# Patient Record
Sex: MALE | Race: WHITE | NOT HISPANIC OR LATINO | Employment: OTHER | URBAN - METROPOLITAN AREA
[De-identification: names, ages, dates, MRNs, and addresses within clinical notes are randomized per-mention and may not be internally consistent; named-entity substitution may affect disease eponyms.]

---

## 2017-01-01 ENCOUNTER — HOSPITAL ENCOUNTER (INPATIENT)
Facility: HOSPITAL | Age: 77
LOS: 1 days | Discharge: HOME | DRG: 312 | End: 2017-09-16
Attending: EMERGENCY MEDICINE | Admitting: FAMILY MEDICINE
Payer: COMMERCIAL

## 2017-01-01 ENCOUNTER — APPOINTMENT (EMERGENCY)
Dept: RADIOLOGY | Facility: HOSPITAL | Age: 77
DRG: 312 | End: 2017-01-01
Payer: COMMERCIAL

## 2017-01-01 VITALS
BODY MASS INDEX: 23.99 KG/M2 | TEMPERATURE: 98.5 F | DIASTOLIC BLOOD PRESSURE: 60 MMHG | SYSTOLIC BLOOD PRESSURE: 100 MMHG | WEIGHT: 181 LBS | OXYGEN SATURATION: 98 % | HEIGHT: 73 IN | RESPIRATION RATE: 18 BRPM | HEART RATE: 83 BPM

## 2017-01-01 DIAGNOSIS — I95.2 HYPOTENSION DUE TO DRUGS: ICD-10-CM

## 2017-01-01 DIAGNOSIS — I27.82 CHRONIC PULMONARY EMBOLISM (HCC): ICD-10-CM

## 2017-01-01 DIAGNOSIS — R55 NEAR SYNCOPE: Primary | ICD-10-CM

## 2017-01-01 DIAGNOSIS — I95.9 HYPOTENSION: ICD-10-CM

## 2017-01-01 DIAGNOSIS — R09.02 HYPOXIA: ICD-10-CM

## 2017-01-01 LAB
ALBUMIN SERPL BCP-MCNC: 2.9 G/DL (ref 3.5–5)
ALBUMIN SERPL BCP-MCNC: 2.9 G/DL (ref 3.5–5)
ALP SERPL-CCNC: 68 U/L (ref 46–116)
ALP SERPL-CCNC: 75 U/L (ref 46–116)
ALT SERPL W P-5'-P-CCNC: 10 U/L (ref 12–78)
ALT SERPL W P-5'-P-CCNC: 23 U/L (ref 12–78)
ANION GAP SERPL CALCULATED.3IONS-SCNC: 7 MMOL/L (ref 4–13)
ANION GAP SERPL CALCULATED.3IONS-SCNC: 8 MMOL/L (ref 4–13)
APTT PPP: 33 SECONDS (ref 24–33)
AST SERPL W P-5'-P-CCNC: 16 U/L (ref 5–45)
AST SERPL W P-5'-P-CCNC: 17 U/L (ref 5–45)
ATRIAL RATE: 87 BPM
BACTERIA BLD CULT: NORMAL
BACTERIA BLD CULT: NORMAL
BASOPHILS # BLD AUTO: 0.1 THOUSANDS/ΜL (ref 0–0.1)
BASOPHILS NFR BLD AUTO: 1 % (ref 0–1)
BILIRUB SERPL-MCNC: 2.1 MG/DL (ref 0.2–1)
BILIRUB SERPL-MCNC: 2.2 MG/DL (ref 0.2–1)
BILIRUB UR QL STRIP: NEGATIVE
BILIRUB UR QL STRIP: NEGATIVE
BUN SERPL-MCNC: 22 MG/DL (ref 5–25)
BUN SERPL-MCNC: 24 MG/DL (ref 5–25)
CALCIUM SERPL-MCNC: 8.3 MG/DL (ref 8.3–10.1)
CALCIUM SERPL-MCNC: 8.5 MG/DL (ref 8.3–10.1)
CHLORIDE SERPL-SCNC: 105 MMOL/L (ref 100–108)
CHLORIDE SERPL-SCNC: 108 MMOL/L (ref 100–108)
CLARITY UR: CLEAR
CLARITY UR: CLEAR
CO2 SERPL-SCNC: 22 MMOL/L (ref 21–32)
CO2 SERPL-SCNC: 24 MMOL/L (ref 21–32)
COLOR UR: NORMAL
COLOR UR: YELLOW
CREAT SERPL-MCNC: 1.13 MG/DL (ref 0.6–1.3)
CREAT SERPL-MCNC: 1.37 MG/DL (ref 0.6–1.3)
DEPRECATED D DIMER PPP: 410 NG/ML (FEU) (ref 190–520)
EOSINOPHIL # BLD AUTO: 0.7 THOUSAND/ΜL (ref 0–0.61)
EOSINOPHIL NFR BLD AUTO: 7 % (ref 0–6)
ERYTHROCYTE [DISTWIDTH] IN BLOOD BY AUTOMATED COUNT: 16.7 % (ref 11.6–15.1)
ERYTHROCYTE [DISTWIDTH] IN BLOOD BY AUTOMATED COUNT: 17 % (ref 11.6–15.1)
FOLATE SERPL-MCNC: >20 NG/ML (ref 3.1–17.5)
GFR SERPL CREATININE-BSD FRML MDRD: 50 ML/MIN/1.73SQ M
GFR SERPL CREATININE-BSD FRML MDRD: 63 ML/MIN/1.73SQ M
GLUCOSE SERPL-MCNC: 140 MG/DL (ref 65–140)
GLUCOSE SERPL-MCNC: 142 MG/DL (ref 65–140)
GLUCOSE SERPL-MCNC: 75 MG/DL (ref 65–140)
GLUCOSE UR STRIP-MCNC: NEGATIVE MG/DL
GLUCOSE UR STRIP-MCNC: NEGATIVE MG/DL
HCT VFR BLD AUTO: 36.8 % (ref 42–52)
HCT VFR BLD AUTO: 38.4 % (ref 42–52)
HGB BLD-MCNC: 11.9 G/DL (ref 14–18)
HGB BLD-MCNC: 12.7 G/DL (ref 14–18)
HGB UR QL STRIP.AUTO: NEGATIVE
HGB UR QL STRIP.AUTO: NEGATIVE
INR PPP: 2.72 (ref 0.86–1.16)
INR PPP: 3.05 (ref 0.86–1.16)
KETONES UR STRIP-MCNC: NEGATIVE MG/DL
KETONES UR STRIP-MCNC: NEGATIVE MG/DL
LACTATE SERPL-SCNC: 1.8 MMOL/L (ref 0.5–2)
LEUKOCYTE ESTERASE UR QL STRIP: NEGATIVE
LEUKOCYTE ESTERASE UR QL STRIP: NEGATIVE
LYMPHOCYTES # BLD AUTO: 1.1 THOUSANDS/ΜL (ref 0.6–4.47)
LYMPHOCYTES NFR BLD AUTO: 12 % (ref 14–44)
MCH RBC QN AUTO: 32.8 PG (ref 27–31)
MCH RBC QN AUTO: 33.5 PG (ref 27–31)
MCHC RBC AUTO-ENTMCNC: 32.2 G/DL (ref 31.4–37.4)
MCHC RBC AUTO-ENTMCNC: 33.1 G/DL (ref 31.4–37.4)
MCV RBC AUTO: 101 FL (ref 82–98)
MCV RBC AUTO: 102 FL (ref 82–98)
MONOCYTES # BLD AUTO: 0.5 THOUSAND/ΜL (ref 0.17–1.22)
MONOCYTES NFR BLD AUTO: 6 % (ref 4–12)
MRSA NOSE QL CULT: NORMAL
NEUTROPHILS # BLD AUTO: 7 THOUSANDS/ΜL (ref 1.85–7.62)
NEUTS SEG NFR BLD AUTO: 74 % (ref 43–75)
NITRITE UR QL STRIP: NEGATIVE
NITRITE UR QL STRIP: NEGATIVE
NRBC BLD AUTO-RTO: 0 /100 WBCS
PH UR STRIP.AUTO: 5 [PH] (ref 5–9)
PH UR STRIP.AUTO: 5 [PH] (ref 5–9)
PLATELET # BLD AUTO: 171 THOUSANDS/UL (ref 130–400)
PLATELET # BLD AUTO: 203 THOUSANDS/UL (ref 130–400)
PMV BLD AUTO: 8.5 FL (ref 8.9–12.7)
PMV BLD AUTO: 8.8 FL (ref 8.9–12.7)
POTASSIUM SERPL-SCNC: 4.3 MMOL/L (ref 3.5–5.3)
POTASSIUM SERPL-SCNC: 4.7 MMOL/L (ref 3.5–5.3)
PROT SERPL-MCNC: 5.5 G/DL (ref 6.4–8.2)
PROT SERPL-MCNC: 5.5 G/DL (ref 6.4–8.2)
PROT UR STRIP-MCNC: NEGATIVE MG/DL
PROT UR STRIP-MCNC: NEGATIVE MG/DL
PROTHROMBIN TIME: 28.8 SECONDS (ref 9.4–11.7)
PROTHROMBIN TIME: 32.4 SECONDS (ref 9.4–11.7)
QRS AXIS: 202 DEGREES
QRSD INTERVAL: 174 MS
QT INTERVAL: 456 MS
QTC INTERVAL: 542 MS
RBC # BLD AUTO: 3.61 MILLION/UL (ref 4.7–6.1)
RBC # BLD AUTO: 3.79 MILLION/UL (ref 4.7–6.1)
SODIUM SERPL-SCNC: 136 MMOL/L (ref 136–145)
SODIUM SERPL-SCNC: 138 MMOL/L (ref 136–145)
SP GR UR STRIP.AUTO: 1.01 (ref 1–1.03)
SP GR UR STRIP.AUTO: 1.01 (ref 1–1.03)
T WAVE AXIS: 2 DEGREES
TROPONIN I SERPL-MCNC: <0.02 NG/ML
UROBILINOGEN UR QL STRIP.AUTO: 0.2 E.U./DL
UROBILINOGEN UR QL STRIP.AUTO: 0.2 E.U./DL
VENTRICULAR RATE: 85 BPM
VIT B12 SERPL-MCNC: 478 PG/ML (ref 100–900)
WBC # BLD AUTO: 7.8 THOUSAND/UL (ref 4.8–10.8)
WBC # BLD AUTO: 9.4 THOUSAND/UL (ref 4.8–10.8)

## 2017-01-01 PROCEDURE — 85379 FIBRIN DEGRADATION QUANT: CPT | Performed by: EMERGENCY MEDICINE

## 2017-01-01 PROCEDURE — 82948 REAGENT STRIP/BLOOD GLUCOSE: CPT

## 2017-01-01 PROCEDURE — 96360 HYDRATION IV INFUSION INIT: CPT

## 2017-01-01 PROCEDURE — 80053 COMPREHEN METABOLIC PANEL: CPT | Performed by: INTERNAL MEDICINE

## 2017-01-01 PROCEDURE — 85610 PROTHROMBIN TIME: CPT | Performed by: EMERGENCY MEDICINE

## 2017-01-01 PROCEDURE — 83605 ASSAY OF LACTIC ACID: CPT | Performed by: EMERGENCY MEDICINE

## 2017-01-01 PROCEDURE — 96361 HYDRATE IV INFUSION ADD-ON: CPT

## 2017-01-01 PROCEDURE — 71010 HB CHEST X-RAY 1 VIEW FRONTAL (PORTABLE): CPT

## 2017-01-01 PROCEDURE — 36415 COLL VENOUS BLD VENIPUNCTURE: CPT | Performed by: EMERGENCY MEDICINE

## 2017-01-01 PROCEDURE — 85610 PROTHROMBIN TIME: CPT | Performed by: INTERNAL MEDICINE

## 2017-01-01 PROCEDURE — 87040 BLOOD CULTURE FOR BACTERIA: CPT | Performed by: EMERGENCY MEDICINE

## 2017-01-01 PROCEDURE — 87081 CULTURE SCREEN ONLY: CPT | Performed by: INTERNAL MEDICINE

## 2017-01-01 PROCEDURE — 85025 COMPLETE CBC W/AUTO DIFF WBC: CPT | Performed by: EMERGENCY MEDICINE

## 2017-01-01 PROCEDURE — 81003 URINALYSIS AUTO W/O SCOPE: CPT | Performed by: EMERGENCY MEDICINE

## 2017-01-01 PROCEDURE — 71275 CT ANGIOGRAPHY CHEST: CPT

## 2017-01-01 PROCEDURE — 81003 URINALYSIS AUTO W/O SCOPE: CPT | Performed by: INTERNAL MEDICINE

## 2017-01-01 PROCEDURE — 85730 THROMBOPLASTIN TIME PARTIAL: CPT | Performed by: EMERGENCY MEDICINE

## 2017-01-01 PROCEDURE — 84484 ASSAY OF TROPONIN QUANT: CPT | Performed by: EMERGENCY MEDICINE

## 2017-01-01 PROCEDURE — 99285 EMERGENCY DEPT VISIT HI MDM: CPT

## 2017-01-01 PROCEDURE — 82746 ASSAY OF FOLIC ACID SERUM: CPT | Performed by: INTERNAL MEDICINE

## 2017-01-01 PROCEDURE — 70450 CT HEAD/BRAIN W/O DYE: CPT

## 2017-01-01 PROCEDURE — 93005 ELECTROCARDIOGRAM TRACING: CPT | Performed by: EMERGENCY MEDICINE

## 2017-01-01 PROCEDURE — 80053 COMPREHEN METABOLIC PANEL: CPT | Performed by: EMERGENCY MEDICINE

## 2017-01-01 PROCEDURE — 82607 VITAMIN B-12: CPT | Performed by: INTERNAL MEDICINE

## 2017-01-01 PROCEDURE — 85027 COMPLETE CBC AUTOMATED: CPT | Performed by: INTERNAL MEDICINE

## 2017-01-01 RX ORDER — FUROSEMIDE 40 MG/1
40 TABLET ORAL DAILY
COMMUNITY
End: 2018-05-12 | Stop reason: HOSPADM

## 2017-01-01 RX ORDER — ACETAMINOPHEN 325 MG/1
650 TABLET ORAL EVERY 6 HOURS PRN
Status: DISCONTINUED | OUTPATIENT
Start: 2017-01-01 | End: 2017-01-01 | Stop reason: HOSPADM

## 2017-01-01 RX ORDER — OXYCODONE HCL 10 MG/1
15 TABLET, FILM COATED, EXTENDED RELEASE ORAL EVERY 4 HOURS PRN
COMMUNITY
End: 2018-01-01

## 2017-01-01 RX ORDER — DIPHENOXYLATE HYDROCHLORIDE AND ATROPINE SULFATE 2.5; .025 MG/1; MG/1
1 TABLET ORAL DAILY
Status: ON HOLD | COMMUNITY
End: 2018-01-01

## 2017-01-01 RX ORDER — WARFARIN SODIUM 2.5 MG/1
1.25 TABLET ORAL
COMMUNITY
End: 2018-01-01 | Stop reason: HOSPADM

## 2017-01-01 RX ORDER — B-COMPLEX WITH VITAMIN C
1 TABLET ORAL 2 TIMES DAILY WITH MEALS
Status: DISCONTINUED | OUTPATIENT
Start: 2017-01-01 | End: 2017-01-01 | Stop reason: HOSPADM

## 2017-01-01 RX ORDER — CARVEDILOL 6.25 MG/1
6.25 TABLET ORAL 2 TIMES DAILY WITH MEALS
COMMUNITY
End: 2018-05-12 | Stop reason: HOSPADM

## 2017-01-01 RX ORDER — WARFARIN SODIUM 5 MG/1
2.5 TABLET ORAL
COMMUNITY
End: 2018-01-01 | Stop reason: HOSPADM

## 2017-01-01 RX ORDER — LISINOPRIL 2.5 MG/1
2.5 TABLET ORAL DAILY
COMMUNITY
End: 2018-01-01

## 2017-01-01 RX ORDER — SODIUM CHLORIDE 9 MG/ML
125 INJECTION, SOLUTION INTRAVENOUS CONTINUOUS
Status: DISCONTINUED | OUTPATIENT
Start: 2017-01-01 | End: 2017-01-01

## 2017-01-01 RX ORDER — ASPIRIN 81 MG/1
81 TABLET ORAL DAILY
Status: DISCONTINUED | OUTPATIENT
Start: 2017-01-01 | End: 2017-01-01 | Stop reason: HOSPADM

## 2017-01-01 RX ORDER — WARFARIN SODIUM 5 MG/1
5 TABLET ORAL
Status: DISCONTINUED | OUTPATIENT
Start: 2017-01-01 | End: 2017-01-01 | Stop reason: HOSPADM

## 2017-01-01 RX ORDER — NITROGLYCERIN 0.4 MG/1
0.4 TABLET SUBLINGUAL
COMMUNITY
End: 2018-05-12 | Stop reason: HOSPADM

## 2017-01-01 RX ORDER — WARFARIN SODIUM 2.5 MG/1
2.5 TABLET ORAL
Status: DISCONTINUED | OUTPATIENT
Start: 2017-01-01 | End: 2017-01-01 | Stop reason: HOSPADM

## 2017-01-01 RX ORDER — ASPIRIN 81 MG/1
81 TABLET ORAL DAILY
COMMUNITY
End: 2018-01-01 | Stop reason: HOSPADM

## 2017-01-01 RX ORDER — EZETIMIBE AND SIMVASTATIN 10; 20 MG/1; MG/1
1 TABLET ORAL
COMMUNITY
End: 2018-05-12 | Stop reason: HOSPADM

## 2017-01-01 RX ORDER — SPIRONOLACTONE 25 MG/1
25 TABLET ORAL DAILY
COMMUNITY
End: 2018-05-12 | Stop reason: HOSPADM

## 2017-01-01 RX ORDER — FAMOTIDINE 20 MG/1
20 TABLET, FILM COATED ORAL DAILY
Status: DISCONTINUED | OUTPATIENT
Start: 2017-01-01 | End: 2017-01-01 | Stop reason: HOSPADM

## 2017-01-01 RX ORDER — SODIUM CHLORIDE 9 MG/ML
100 INJECTION, SOLUTION INTRAVENOUS CONTINUOUS
Status: DISCONTINUED | OUTPATIENT
Start: 2017-01-01 | End: 2017-01-01 | Stop reason: HOSPADM

## 2017-01-01 RX ADMIN — FAMOTIDINE 20 MG: 20 TABLET ORAL at 14:06

## 2017-01-01 RX ADMIN — CARBIDOPA AND LEVODOPA 1 TABLET: 25; 100 TABLET ORAL at 22:03

## 2017-01-01 RX ADMIN — IOHEXOL 85 ML: 350 INJECTION, SOLUTION INTRAVENOUS at 13:14

## 2017-01-01 RX ADMIN — WARFARIN SODIUM 5 MG: 5 TABLET ORAL at 20:43

## 2017-01-01 RX ADMIN — CARBIDOPA AND LEVODOPA 1 TABLET: 25; 100 TABLET ORAL at 08:48

## 2017-01-01 RX ADMIN — SODIUM CHLORIDE 125 ML/HR: 0.9 INJECTION, SOLUTION INTRAVENOUS at 16:10

## 2017-01-01 RX ADMIN — SODIUM CHLORIDE 1000 ML: 0.9 INJECTION, SOLUTION INTRAVENOUS at 14:11

## 2017-01-01 RX ADMIN — PRAVASTATIN SODIUM: 40 TABLET ORAL at 22:05

## 2017-01-01 RX ADMIN — ASPIRIN 81 MG: 81 TABLET ORAL at 08:47

## 2017-01-01 RX ADMIN — CALCIUM CARBONATE 500 MG (1,250 MG)-VITAMIN D3 200 UNIT TABLET 1 TABLET: at 08:48

## 2017-01-01 RX ADMIN — SODIUM CHLORIDE 1000 ML: 0.9 INJECTION, SOLUTION INTRAVENOUS at 12:22

## 2017-01-01 RX ADMIN — SODIUM CHLORIDE 100 ML/HR: 0.9 INJECTION, SOLUTION INTRAVENOUS at 20:44

## 2017-09-15 PROBLEM — I95.2 HYPOTENSION DUE TO DRUGS: Status: ACTIVE | Noted: 2017-01-01

## 2018-01-01 ENCOUNTER — APPOINTMENT (EMERGENCY)
Dept: CT IMAGING | Facility: HOSPITAL | Age: 78
End: 2018-01-01
Payer: COMMERCIAL

## 2018-01-01 ENCOUNTER — APPOINTMENT (EMERGENCY)
Dept: RADIOLOGY | Facility: HOSPITAL | Age: 78
DRG: 871 | End: 2018-01-01
Payer: COMMERCIAL

## 2018-01-01 ENCOUNTER — APPOINTMENT (INPATIENT)
Dept: RADIOLOGY | Facility: HOSPITAL | Age: 78
DRG: 871 | End: 2018-01-01
Attending: INTERNAL MEDICINE
Payer: COMMERCIAL

## 2018-01-01 ENCOUNTER — OFFICE VISIT (OUTPATIENT)
Dept: NEUROSURGERY | Facility: CLINIC | Age: 78
End: 2018-01-01
Payer: COMMERCIAL

## 2018-01-01 ENCOUNTER — APPOINTMENT (INPATIENT)
Dept: RADIOLOGY | Facility: HOSPITAL | Age: 78
DRG: 083 | End: 2018-01-01
Payer: COMMERCIAL

## 2018-01-01 ENCOUNTER — DOCUMENTATION (OUTPATIENT)
Dept: NEUROSURGERY | Facility: CLINIC | Age: 78
End: 2018-01-01

## 2018-01-01 ENCOUNTER — HOSPITAL ENCOUNTER (INPATIENT)
Facility: HOSPITAL | Age: 78
LOS: 3 days | Discharge: NURSING, REHAB OR LONG-TERM CARE | DRG: 083 | End: 2018-04-27
Attending: SURGERY | Admitting: SURGERY
Payer: COMMERCIAL

## 2018-01-01 ENCOUNTER — HOSPITAL ENCOUNTER (INPATIENT)
Facility: HOSPITAL | Age: 78
LOS: 3 days | DRG: 871 | End: 2018-05-12
Attending: EMERGENCY MEDICINE | Admitting: INTERNAL MEDICINE
Payer: COMMERCIAL

## 2018-01-01 ENCOUNTER — APPOINTMENT (INPATIENT)
Dept: RADIOLOGY | Facility: HOSPITAL | Age: 78
DRG: 083 | End: 2018-01-01
Attending: EMERGENCY MEDICINE
Payer: COMMERCIAL

## 2018-01-01 ENCOUNTER — TELEPHONE (OUTPATIENT)
Dept: NEUROSURGERY | Facility: CLINIC | Age: 78
End: 2018-01-01

## 2018-01-01 ENCOUNTER — HOSPITAL ENCOUNTER (EMERGENCY)
Facility: HOSPITAL | Age: 78
End: 2018-04-24
Attending: EMERGENCY MEDICINE | Admitting: EMERGENCY MEDICINE
Payer: COMMERCIAL

## 2018-01-01 VITALS
OXYGEN SATURATION: 100 % | WEIGHT: 156 LBS | SYSTOLIC BLOOD PRESSURE: 102 MMHG | HEIGHT: 73 IN | RESPIRATION RATE: 36 BRPM | TEMPERATURE: 98.4 F | DIASTOLIC BLOOD PRESSURE: 64 MMHG | BODY MASS INDEX: 20.67 KG/M2 | HEART RATE: 91 BPM

## 2018-01-01 VITALS
TEMPERATURE: 97.5 F | WEIGHT: 156.97 LBS | RESPIRATION RATE: 18 BRPM | HEART RATE: 91 BPM | DIASTOLIC BLOOD PRESSURE: 58 MMHG | OXYGEN SATURATION: 98 % | BODY MASS INDEX: 20.8 KG/M2 | HEIGHT: 73 IN | SYSTOLIC BLOOD PRESSURE: 105 MMHG

## 2018-01-01 VITALS
BODY MASS INDEX: 20.67 KG/M2 | DIASTOLIC BLOOD PRESSURE: 66 MMHG | SYSTOLIC BLOOD PRESSURE: 118 MMHG | HEART RATE: 64 BPM | TEMPERATURE: 97.2 F | WEIGHT: 156 LBS | HEIGHT: 73 IN | RESPIRATION RATE: 16 BRPM

## 2018-01-01 VITALS
SYSTOLIC BLOOD PRESSURE: 80 MMHG | HEIGHT: 73 IN | DIASTOLIC BLOOD PRESSURE: 40 MMHG | HEART RATE: 66 BPM | RESPIRATION RATE: 48 BRPM

## 2018-01-01 VITALS
SYSTOLIC BLOOD PRESSURE: 138 MMHG | WEIGHT: 146.61 LBS | TEMPERATURE: 99 F | OXYGEN SATURATION: 96 % | HEART RATE: 105 BPM | BODY MASS INDEX: 19.43 KG/M2 | DIASTOLIC BLOOD PRESSURE: 64 MMHG | HEIGHT: 73 IN | RESPIRATION RATE: 22 BRPM

## 2018-01-01 DIAGNOSIS — D68.9 COAGULOPATHY (HCC): ICD-10-CM

## 2018-01-01 DIAGNOSIS — Z91.89 AT HIGH RISK FOR ASPIRATION: ICD-10-CM

## 2018-01-01 DIAGNOSIS — I61.9 INTRACEREBRAL HEMORRHAGE (HCC): Primary | ICD-10-CM

## 2018-01-01 DIAGNOSIS — S06.5X9A SUBDURAL HEMATOMA (HCC): Primary | ICD-10-CM

## 2018-01-01 DIAGNOSIS — E46 MALNUTRITION (HCC): ICD-10-CM

## 2018-01-01 DIAGNOSIS — S06.5X9A SDH (SUBDURAL HEMATOMA) (HCC): ICD-10-CM

## 2018-01-01 DIAGNOSIS — Z86.79 HISTORY OF SUBDURAL HEMATOMA: ICD-10-CM

## 2018-01-01 DIAGNOSIS — A41.9 SEVERE SEPSIS (HCC): Primary | ICD-10-CM

## 2018-01-01 DIAGNOSIS — G92.8 TOXIC METABOLIC ENCEPHALOPATHY: ICD-10-CM

## 2018-01-01 DIAGNOSIS — I62.00 SUBDURAL HEMORRHAGE (HCC): Primary | ICD-10-CM

## 2018-01-01 DIAGNOSIS — I95.9 HYPOTENSION: ICD-10-CM

## 2018-01-01 DIAGNOSIS — E87.2 LACTIC ACIDOSIS: ICD-10-CM

## 2018-01-01 DIAGNOSIS — R41.0 DISORIENTATION: ICD-10-CM

## 2018-01-01 DIAGNOSIS — R06.82 TACHYPNEA: Primary | ICD-10-CM

## 2018-01-01 DIAGNOSIS — N17.9 AKI (ACUTE KIDNEY INJURY) (HCC): ICD-10-CM

## 2018-01-01 DIAGNOSIS — I95.9 HYPOTENSION, UNSPECIFIED HYPOTENSION TYPE: ICD-10-CM

## 2018-01-01 DIAGNOSIS — E86.0 DEHYDRATION: ICD-10-CM

## 2018-01-01 DIAGNOSIS — R65.20 SEVERE SEPSIS (HCC): Primary | ICD-10-CM

## 2018-01-01 LAB
ABO GROUP BLD: NORMAL
ABO GROUP BLD: NORMAL
ALBUMIN SERPL BCP-MCNC: 2.6 G/DL (ref 3.5–5)
ALBUMIN SERPL BCP-MCNC: 2.7 G/DL (ref 3.5–5)
ALBUMIN SERPL BCP-MCNC: 3.1 G/DL (ref 3.5–5)
ALBUMIN SERPL BCP-MCNC: 3.2 G/DL (ref 3.5–5)
ALBUMIN SERPL BCP-MCNC: 3.3 G/DL (ref 3.5–5)
ALP SERPL-CCNC: 101 U/L (ref 46–116)
ALP SERPL-CCNC: 109 U/L (ref 46–116)
ALP SERPL-CCNC: 115 U/L (ref 46–116)
ALP SERPL-CCNC: 83 U/L (ref 46–116)
ALP SERPL-CCNC: 91 U/L (ref 46–116)
ALT SERPL W P-5'-P-CCNC: 8 U/L (ref 12–78)
ALT SERPL W P-5'-P-CCNC: 9 U/L (ref 12–78)
ALT SERPL W P-5'-P-CCNC: <6 U/L (ref 12–78)
ANION GAP BLD CALC-SCNC: 19 MMOL/L (ref 4–13)
ANION GAP SERPL CALCULATED.3IONS-SCNC: 10 MMOL/L (ref 4–13)
ANION GAP SERPL CALCULATED.3IONS-SCNC: 11 MMOL/L (ref 4–13)
ANION GAP SERPL CALCULATED.3IONS-SCNC: 13 MMOL/L (ref 4–13)
ANION GAP SERPL CALCULATED.3IONS-SCNC: 6 MMOL/L (ref 4–13)
ANION GAP SERPL CALCULATED.3IONS-SCNC: 9 MMOL/L (ref 4–13)
ANION GAP SERPL CALCULATED.3IONS-SCNC: 9 MMOL/L (ref 4–13)
ANISOCYTOSIS BLD QL SMEAR: PRESENT
APTT PPP: 37 SECONDS (ref 23–35)
APTT PPP: 60 SECONDS (ref 23–35)
AST SERPL W P-5'-P-CCNC: 12 U/L (ref 5–45)
AST SERPL W P-5'-P-CCNC: 14 U/L (ref 5–45)
AST SERPL W P-5'-P-CCNC: 21 U/L (ref 5–45)
AST SERPL W P-5'-P-CCNC: 24 U/L (ref 5–45)
AST SERPL W P-5'-P-CCNC: 32 U/L (ref 5–45)
ATRIAL RATE: 105 BPM
ATRIAL RATE: 96 BPM
ATRIAL RATE: 97 BPM
BACTERIA UR QL AUTO: ABNORMAL /HPF
BASE EX.OXY STD BLDV CALC-SCNC: 86.5 % (ref 60–80)
BASE EXCESS BLDV CALC-SCNC: 1.2 MMOL/L
BASOPHILS # BLD AUTO: 0.07 THOUSANDS/ΜL (ref 0–0.1)
BASOPHILS # BLD AUTO: 0.08 THOUSANDS/ΜL (ref 0–0.1)
BASOPHILS # BLD AUTO: 0.08 THOUSANDS/ΜL (ref 0–0.1)
BASOPHILS # BLD MANUAL: 0 THOUSAND/UL (ref 0–0.1)
BASOPHILS NFR BLD AUTO: 1 % (ref 0–1)
BASOPHILS NFR MAR MANUAL: 0 % (ref 0–1)
BILIRUB DIRECT SERPL-MCNC: 0.89 MG/DL (ref 0–0.2)
BILIRUB SERPL-MCNC: 2.5 MG/DL (ref 0.2–1)
BILIRUB SERPL-MCNC: 2.88 MG/DL (ref 0.2–1)
BILIRUB SERPL-MCNC: 3.52 MG/DL (ref 0.2–1)
BILIRUB SERPL-MCNC: 3.78 MG/DL (ref 0.2–1)
BILIRUB SERPL-MCNC: 4.01 MG/DL (ref 0.2–1)
BILIRUB UR QL STRIP: NEGATIVE
BLD GP AB SCN SERPL QL: NEGATIVE
BLD GP AB SCN SERPL QL: NEGATIVE
BUN BLD-MCNC: 14 MG/DL (ref 5–25)
BUN SERPL-MCNC: 15 MG/DL (ref 5–25)
BUN SERPL-MCNC: 17 MG/DL (ref 5–25)
BUN SERPL-MCNC: 24 MG/DL (ref 5–25)
BUN SERPL-MCNC: 31 MG/DL (ref 5–25)
BUN SERPL-MCNC: 69 MG/DL (ref 5–25)
BUN SERPL-MCNC: 75 MG/DL (ref 5–25)
CA-I BLD-SCNC: 1.11 MMOL/L (ref 1.12–1.32)
CALCIUM SERPL-MCNC: 8.4 MG/DL (ref 8.3–10.1)
CALCIUM SERPL-MCNC: 8.5 MG/DL (ref 8.3–10.1)
CALCIUM SERPL-MCNC: 8.8 MG/DL (ref 8.3–10.1)
CALCIUM SERPL-MCNC: 8.9 MG/DL (ref 8.3–10.1)
CALCIUM SERPL-MCNC: 9.1 MG/DL (ref 8.3–10.1)
CALCIUM SERPL-MCNC: 9.7 MG/DL (ref 8.3–10.1)
CHLORIDE BLD-SCNC: 95 MMOL/L (ref 100–108)
CHLORIDE SERPL-SCNC: 102 MMOL/L (ref 100–108)
CHLORIDE SERPL-SCNC: 104 MMOL/L (ref 100–108)
CHLORIDE SERPL-SCNC: 104 MMOL/L (ref 100–108)
CHLORIDE SERPL-SCNC: 105 MMOL/L (ref 100–108)
CHLORIDE SERPL-SCNC: 106 MMOL/L (ref 100–108)
CHLORIDE SERPL-SCNC: 112 MMOL/L (ref 100–108)
CLARITY UR: ABNORMAL
CO2 SERPL-SCNC: 25 MMOL/L (ref 21–32)
CO2 SERPL-SCNC: 25 MMOL/L (ref 21–32)
CO2 SERPL-SCNC: 27 MMOL/L (ref 21–32)
CO2 SERPL-SCNC: 27 MMOL/L (ref 21–32)
CO2 SERPL-SCNC: 29 MMOL/L (ref 21–32)
CO2 SERPL-SCNC: 31 MMOL/L (ref 21–32)
COLOR UR: YELLOW
CREAT BLD-MCNC: 1 MG/DL (ref 0.6–1.3)
CREAT SERPL-MCNC: 1.15 MG/DL (ref 0.6–1.3)
CREAT SERPL-MCNC: 1.22 MG/DL (ref 0.6–1.3)
CREAT SERPL-MCNC: 1.31 MG/DL (ref 0.6–1.3)
CREAT SERPL-MCNC: 1.4 MG/DL (ref 0.6–1.3)
CREAT SERPL-MCNC: 1.41 MG/DL (ref 0.6–1.3)
CREAT SERPL-MCNC: 1.78 MG/DL (ref 0.6–1.3)
EOSINOPHIL # BLD AUTO: 0.7 THOUSAND/ΜL (ref 0–0.61)
EOSINOPHIL # BLD AUTO: 0.78 THOUSAND/ΜL (ref 0–0.61)
EOSINOPHIL # BLD AUTO: 1.17 THOUSAND/ΜL (ref 0–0.61)
EOSINOPHIL # BLD MANUAL: 0 THOUSAND/UL (ref 0–0.4)
EOSINOPHIL NFR BLD AUTO: 10 % (ref 0–6)
EOSINOPHIL NFR BLD AUTO: 11 % (ref 0–6)
EOSINOPHIL NFR BLD AUTO: 20 % (ref 0–6)
EOSINOPHIL NFR BLD MANUAL: 0 % (ref 0–6)
ERYTHROCYTE [DISTWIDTH] IN BLOOD BY AUTOMATED COUNT: 22.2 % (ref 11.6–15.1)
ERYTHROCYTE [DISTWIDTH] IN BLOOD BY AUTOMATED COUNT: 22.3 % (ref 11.6–15.1)
ERYTHROCYTE [DISTWIDTH] IN BLOOD BY AUTOMATED COUNT: 22.5 % (ref 11.6–15.1)
ERYTHROCYTE [DISTWIDTH] IN BLOOD BY AUTOMATED COUNT: 22.7 % (ref 11.6–15.1)
ERYTHROCYTE [DISTWIDTH] IN BLOOD BY AUTOMATED COUNT: 22.7 % (ref 11.6–15.1)
ERYTHROCYTE [DISTWIDTH] IN BLOOD BY AUTOMATED COUNT: 22.9 % (ref 11.6–15.1)
FOLATE SERPL-MCNC: 6 NG/ML (ref 3.1–17.5)
GFR SERPL CREATININE-BSD FRML MDRD: 36 ML/MIN/1.73SQ M
GFR SERPL CREATININE-BSD FRML MDRD: 48 ML/MIN/1.73SQ M
GFR SERPL CREATININE-BSD FRML MDRD: 48 ML/MIN/1.73SQ M
GFR SERPL CREATININE-BSD FRML MDRD: 52 ML/MIN/1.73SQ M
GFR SERPL CREATININE-BSD FRML MDRD: 57 ML/MIN/1.73SQ M
GFR SERPL CREATININE-BSD FRML MDRD: 61 ML/MIN/1.73SQ M
GFR SERPL CREATININE-BSD FRML MDRD: 72 ML/MIN/1.73SQ M
GLUCOSE SERPL-MCNC: 110 MG/DL (ref 65–140)
GLUCOSE SERPL-MCNC: 117 MG/DL (ref 65–140)
GLUCOSE SERPL-MCNC: 120 MG/DL (ref 65–140)
GLUCOSE SERPL-MCNC: 122 MG/DL (ref 65–140)
GLUCOSE SERPL-MCNC: 141 MG/DL (ref 65–140)
GLUCOSE SERPL-MCNC: 143 MG/DL (ref 65–140)
GLUCOSE SERPL-MCNC: 148 MG/DL (ref 65–140)
GLUCOSE SERPL-MCNC: 157 MG/DL (ref 65–140)
GLUCOSE SERPL-MCNC: 159 MG/DL (ref 65–140)
GLUCOSE SERPL-MCNC: 163 MG/DL (ref 65–140)
GLUCOSE UR STRIP-MCNC: NEGATIVE MG/DL
HCO3 BLDV-SCNC: 26.6 MMOL/L (ref 24–30)
HCT VFR BLD AUTO: 30.1 % (ref 36.5–49.3)
HCT VFR BLD AUTO: 30.5 % (ref 36.5–49.3)
HCT VFR BLD AUTO: 32.8 % (ref 36.5–49.3)
HCT VFR BLD AUTO: 33.1 % (ref 36.5–49.3)
HCT VFR BLD AUTO: 33.5 % (ref 36.5–49.3)
HCT VFR BLD AUTO: 34 % (ref 36.5–49.3)
HCT VFR BLD CALC: 36 % (ref 36.5–49.3)
HGB BLD-MCNC: 10.2 G/DL (ref 12–17)
HGB BLD-MCNC: 10.5 G/DL (ref 12–17)
HGB BLD-MCNC: 10.5 G/DL (ref 12–17)
HGB BLD-MCNC: 10.6 G/DL (ref 12–17)
HGB BLD-MCNC: 9.4 G/DL (ref 12–17)
HGB BLD-MCNC: 9.6 G/DL (ref 12–17)
HGB BLDA-MCNC: 12.2 G/DL (ref 12–17)
HGB UR QL STRIP.AUTO: NEGATIVE
HYALINE CASTS #/AREA URNS LPF: ABNORMAL /LPF
HYPERCHROMIA BLD QL SMEAR: PRESENT
INR PPP: 1.47 (ref 0.86–1.16)
INR PPP: 1.83 (ref 0.86–1.16)
INR PPP: 1.91 (ref 0.86–1.16)
INR PPP: 1.93 (ref 0.86–1.16)
INR PPP: 1.99 (ref 0.86–1.16)
INR PPP: 8.14 (ref 0.86–1.16)
INR PPP: 9.07 (ref 0.86–1.16)
KETONES UR STRIP-MCNC: NEGATIVE MG/DL
LACTATE SERPL-SCNC: 1.3 MMOL/L (ref 0.5–2)
LACTATE SERPL-SCNC: 5.6 MMOL/L (ref 0.5–2)
LEUKOCYTE ESTERASE UR QL STRIP: ABNORMAL
LYMPHOCYTES # BLD AUTO: 0 % (ref 14–44)
LYMPHOCYTES # BLD AUTO: 0 THOUSAND/UL (ref 0.6–4.47)
LYMPHOCYTES # BLD AUTO: 0.78 THOUSANDS/ΜL (ref 0.6–4.47)
LYMPHOCYTES # BLD AUTO: 0.83 THOUSANDS/ΜL (ref 0.6–4.47)
LYMPHOCYTES # BLD AUTO: 0.86 THOUSANDS/ΜL (ref 0.6–4.47)
LYMPHOCYTES NFR BLD AUTO: 12 % (ref 14–44)
LYMPHOCYTES NFR BLD AUTO: 12 % (ref 14–44)
LYMPHOCYTES NFR BLD AUTO: 13 % (ref 14–44)
MACROCYTES BLD QL AUTO: PRESENT
MAGNESIUM SERPL-MCNC: 2 MG/DL (ref 1.6–2.6)
MAGNESIUM SERPL-MCNC: 2.1 MG/DL (ref 1.6–2.6)
MCH RBC QN AUTO: 26.4 PG (ref 26.8–34.3)
MCH RBC QN AUTO: 26.7 PG (ref 26.8–34.3)
MCH RBC QN AUTO: 26.9 PG (ref 26.8–34.3)
MCH RBC QN AUTO: 26.9 PG (ref 26.8–34.3)
MCH RBC QN AUTO: 27 PG (ref 26.8–34.3)
MCH RBC QN AUTO: 27.1 PG (ref 26.8–34.3)
MCHC RBC AUTO-ENTMCNC: 30.9 G/DL (ref 31.4–37.4)
MCHC RBC AUTO-ENTMCNC: 31.1 G/DL (ref 31.4–37.4)
MCHC RBC AUTO-ENTMCNC: 31.2 G/DL (ref 31.4–37.4)
MCHC RBC AUTO-ENTMCNC: 31.3 G/DL (ref 31.4–37.4)
MCHC RBC AUTO-ENTMCNC: 31.5 G/DL (ref 31.4–37.4)
MCHC RBC AUTO-ENTMCNC: 32 G/DL (ref 31.4–37.4)
MCV RBC AUTO: 84 FL (ref 82–98)
MCV RBC AUTO: 84 FL (ref 82–98)
MCV RBC AUTO: 86 FL (ref 82–98)
MCV RBC AUTO: 87 FL (ref 82–98)
MONOCYTES # BLD AUTO: 0.14 THOUSAND/UL (ref 0–1.22)
MONOCYTES # BLD AUTO: 0.52 THOUSAND/ΜL (ref 0.17–1.22)
MONOCYTES # BLD AUTO: 0.52 THOUSAND/ΜL (ref 0.17–1.22)
MONOCYTES # BLD AUTO: 0.62 THOUSAND/ΜL (ref 0.17–1.22)
MONOCYTES NFR BLD AUTO: 8 % (ref 4–12)
MONOCYTES NFR BLD AUTO: 9 % (ref 4–12)
MONOCYTES NFR BLD AUTO: 9 % (ref 4–12)
MONOCYTES NFR BLD: 1 % (ref 4–12)
NEUTROPHILS # BLD AUTO: 3.35 THOUSANDS/ΜL (ref 1.85–7.62)
NEUTROPHILS # BLD AUTO: 4.64 THOUSANDS/ΜL (ref 1.85–7.62)
NEUTROPHILS # BLD AUTO: 4.68 THOUSANDS/ΜL (ref 1.85–7.62)
NEUTROPHILS # BLD MANUAL: 14.07 THOUSAND/UL (ref 1.85–7.62)
NEUTS SEG NFR BLD AUTO: 57 % (ref 43–75)
NEUTS SEG NFR BLD AUTO: 67 % (ref 43–75)
NEUTS SEG NFR BLD AUTO: 69 % (ref 43–75)
NEUTS SEG NFR BLD AUTO: 99 % (ref 43–75)
NITRITE UR QL STRIP: NEGATIVE
NON-SQ EPI CELLS URNS QL MICRO: ABNORMAL /HPF
NRBC BLD AUTO-RTO: 0 /100 WBCS
O2 CT BLDV-SCNC: 12.6 ML/DL
P AXIS: 88 DEGREES
P AXIS: 96 DEGREES
PCO2 BLD: 30 MMOL/L (ref 21–32)
PCO2 BLDV: 45.4 MM HG (ref 42–50)
PH BLDV: 7.38 [PH] (ref 7.3–7.4)
PH UR STRIP.AUTO: 5 [PH] (ref 4.5–8)
PHOSPHATE SERPL-MCNC: 2.2 MG/DL (ref 2.3–4.1)
PHOSPHATE SERPL-MCNC: 3.1 MG/DL (ref 2.3–4.1)
PLATELET # BLD AUTO: 160 THOUSANDS/UL (ref 149–390)
PLATELET # BLD AUTO: 181 THOUSANDS/UL (ref 149–390)
PLATELET # BLD AUTO: 181 THOUSANDS/UL (ref 149–390)
PLATELET # BLD AUTO: 194 THOUSANDS/UL (ref 149–390)
PLATELET # BLD AUTO: 205 THOUSANDS/UL (ref 149–390)
PLATELET # BLD AUTO: 250 THOUSANDS/UL (ref 149–390)
PLATELET BLD QL SMEAR: ADEQUATE
PMV BLD AUTO: 10.1 FL (ref 8.9–12.7)
PMV BLD AUTO: 10.2 FL (ref 8.9–12.7)
PMV BLD AUTO: 9.7 FL (ref 8.9–12.7)
PO2 BLDV: 63.2 MM HG (ref 35–45)
POIKILOCYTOSIS BLD QL SMEAR: PRESENT
POTASSIUM BLD-SCNC: 2.6 MMOL/L (ref 3.5–5.3)
POTASSIUM SERPL-SCNC: 2.6 MMOL/L (ref 3.5–5.3)
POTASSIUM SERPL-SCNC: 3.3 MMOL/L (ref 3.5–5.3)
POTASSIUM SERPL-SCNC: 3.5 MMOL/L (ref 3.5–5.3)
POTASSIUM SERPL-SCNC: 3.6 MMOL/L (ref 3.5–5.3)
POTASSIUM SERPL-SCNC: 3.7 MMOL/L (ref 3.5–5.3)
POTASSIUM SERPL-SCNC: 4 MMOL/L (ref 3.5–5.3)
PR INTERVAL: 154 MS
PROCALCITONIN SERPL-MCNC: 1.22 NG/ML
PROT SERPL-MCNC: 5.6 G/DL (ref 6.4–8.2)
PROT SERPL-MCNC: 5.8 G/DL (ref 6.4–8.2)
PROT SERPL-MCNC: 6.1 G/DL (ref 6.4–8.2)
PROT SERPL-MCNC: 6.2 G/DL (ref 6.4–8.2)
PROT SERPL-MCNC: 6.7 G/DL (ref 6.4–8.2)
PROT UR STRIP-MCNC: ABNORMAL MG/DL
PROTHROMBIN TIME: 17.9 SECONDS (ref 12.1–14.4)
PROTHROMBIN TIME: 21.3 SECONDS (ref 12.1–14.4)
PROTHROMBIN TIME: 22.1 SECONDS (ref 12.1–14.4)
PROTHROMBIN TIME: 22.2 SECONDS (ref 12.1–14.4)
PROTHROMBIN TIME: 22.8 SECONDS (ref 12.1–14.4)
PROTHROMBIN TIME: 69.7 SECONDS (ref 12.1–14.4)
PROTHROMBIN TIME: 74.8 SECONDS (ref 12.1–14.4)
QRS AXIS: 246 DEGREES
QRS AXIS: 248 DEGREES
QRS AXIS: 265 DEGREES
QRSD INTERVAL: 162 MS
QRSD INTERVAL: 166 MS
QRSD INTERVAL: 170 MS
QT INTERVAL: 464 MS
QT INTERVAL: 466 MS
QT INTERVAL: 466 MS
QTC INTERVAL: 576 MS
QTC INTERVAL: 582 MS
QTC INTERVAL: 586 MS
RBC # BLD AUTO: 3.5 MILLION/UL (ref 3.88–5.62)
RBC # BLD AUTO: 3.54 MILLION/UL (ref 3.88–5.62)
RBC # BLD AUTO: 3.82 MILLION/UL (ref 3.88–5.62)
RBC # BLD AUTO: 3.91 MILLION/UL (ref 3.88–5.62)
RBC # BLD AUTO: 3.93 MILLION/UL (ref 3.88–5.62)
RBC # BLD AUTO: 3.97 MILLION/UL (ref 3.88–5.62)
RBC #/AREA URNS AUTO: ABNORMAL /HPF
RBC MORPH BLD: PRESENT
RH BLD: POSITIVE
RH BLD: POSITIVE
SODIUM BLD-SCNC: 141 MMOL/L (ref 136–145)
SODIUM SERPL-SCNC: 139 MMOL/L (ref 136–145)
SODIUM SERPL-SCNC: 140 MMOL/L (ref 136–145)
SODIUM SERPL-SCNC: 142 MMOL/L (ref 136–145)
SODIUM SERPL-SCNC: 143 MMOL/L (ref 136–145)
SODIUM SERPL-SCNC: 144 MMOL/L (ref 136–145)
SODIUM SERPL-SCNC: 147 MMOL/L (ref 136–145)
SP GR UR STRIP.AUTO: 1.01 (ref 1–1.03)
SPECIMEN EXPIRATION DATE: NORMAL
SPECIMEN EXPIRATION DATE: NORMAL
SPECIMEN SOURCE: ABNORMAL
T WAVE AXIS: 53 DEGREES
T WAVE AXIS: 74 DEGREES
T WAVE AXIS: 79 DEGREES
T4 FREE SERPL-MCNC: 5.42 NG/DL (ref 0.76–1.46)
TARGETS BLD QL SMEAR: PRESENT
TSH SERPL DL<=0.05 MIU/L-ACNC: <0.007 UIU/ML (ref 0.36–3.74)
TSH SERPL DL<=0.05 MIU/L-ACNC: <0.007 UIU/ML (ref 0.36–3.74)
UROBILINOGEN UR QL STRIP.AUTO: 1 E.U./DL
VENTRICULAR RATE: 92 BPM
VENTRICULAR RATE: 94 BPM
VENTRICULAR RATE: 96 BPM
VIT B12 SERPL-MCNC: 582 PG/ML (ref 100–900)
WBC # BLD AUTO: 12.93 THOUSAND/UL (ref 4.31–10.16)
WBC # BLD AUTO: 14.21 THOUSAND/UL (ref 4.31–10.16)
WBC # BLD AUTO: 5.81 THOUSAND/UL (ref 4.31–10.16)
WBC # BLD AUTO: 5.9 THOUSAND/UL (ref 4.31–10.16)
WBC # BLD AUTO: 6.78 THOUSAND/UL (ref 4.31–10.16)
WBC # BLD AUTO: 7.02 THOUSAND/UL (ref 4.31–10.16)
WBC #/AREA URNS AUTO: ABNORMAL /HPF

## 2018-01-01 PROCEDURE — 86900 BLOOD TYPING SEROLOGIC ABO: CPT | Performed by: EMERGENCY MEDICINE

## 2018-01-01 PROCEDURE — C9132 KCENTRA, PER I.U.: HCPCS | Performed by: SURGERY

## 2018-01-01 PROCEDURE — 82948 REAGENT STRIP/BLOOD GLUCOSE: CPT

## 2018-01-01 PROCEDURE — 96375 TX/PRO/DX INJ NEW DRUG ADDON: CPT

## 2018-01-01 PROCEDURE — 85610 PROTHROMBIN TIME: CPT | Performed by: EMERGENCY MEDICINE

## 2018-01-01 PROCEDURE — 99285 EMERGENCY DEPT VISIT HI MDM: CPT

## 2018-01-01 PROCEDURE — 93005 ELECTROCARDIOGRAM TRACING: CPT

## 2018-01-01 PROCEDURE — 73080 X-RAY EXAM OF ELBOW: CPT

## 2018-01-01 PROCEDURE — 99233 SBSQ HOSP IP/OBS HIGH 50: CPT | Performed by: SURGERY

## 2018-01-01 PROCEDURE — 96367 TX/PROPH/DG ADDL SEQ IV INF: CPT

## 2018-01-01 PROCEDURE — 97163 PT EVAL HIGH COMPLEX 45 MIN: CPT

## 2018-01-01 PROCEDURE — 85730 THROMBOPLASTIN TIME PARTIAL: CPT | Performed by: EMERGENCY MEDICINE

## 2018-01-01 PROCEDURE — 82746 ASSAY OF FOLIC ACID SERUM: CPT | Performed by: PHYSICIAN ASSISTANT

## 2018-01-01 PROCEDURE — 80053 COMPREHEN METABOLIC PANEL: CPT | Performed by: EMERGENCY MEDICINE

## 2018-01-01 PROCEDURE — 97167 OT EVAL HIGH COMPLEX 60 MIN: CPT

## 2018-01-01 PROCEDURE — 70450 CT HEAD/BRAIN W/O DYE: CPT

## 2018-01-01 PROCEDURE — 99232 SBSQ HOSP IP/OBS MODERATE 35: CPT | Performed by: INTERNAL MEDICINE

## 2018-01-01 PROCEDURE — 96361 HYDRATE IV INFUSION ADD-ON: CPT

## 2018-01-01 PROCEDURE — 99238 HOSP IP/OBS DSCHRG MGMT 30/<: CPT | Performed by: SURGERY

## 2018-01-01 PROCEDURE — 85027 COMPLETE CBC AUTOMATED: CPT | Performed by: INTERNAL MEDICINE

## 2018-01-01 PROCEDURE — 85027 COMPLETE CBC AUTOMATED: CPT | Performed by: EMERGENCY MEDICINE

## 2018-01-01 PROCEDURE — 85025 COMPLETE CBC W/AUTO DIFF WBC: CPT | Performed by: EMERGENCY MEDICINE

## 2018-01-01 PROCEDURE — 99223 1ST HOSP IP/OBS HIGH 75: CPT | Performed by: SURGERY

## 2018-01-01 PROCEDURE — 83605 ASSAY OF LACTIC ACID: CPT | Performed by: EMERGENCY MEDICINE

## 2018-01-01 PROCEDURE — 93010 ELECTROCARDIOGRAM REPORT: CPT | Performed by: INTERNAL MEDICINE

## 2018-01-01 PROCEDURE — 99213 OFFICE O/P EST LOW 20 MIN: CPT | Performed by: PHYSICIAN ASSISTANT

## 2018-01-01 PROCEDURE — G8979 MOBILITY GOAL STATUS: HCPCS

## 2018-01-01 PROCEDURE — 84100 ASSAY OF PHOSPHORUS: CPT | Performed by: EMERGENCY MEDICINE

## 2018-01-01 PROCEDURE — 85610 PROTHROMBIN TIME: CPT | Performed by: SURGERY

## 2018-01-01 PROCEDURE — 96365 THER/PROPH/DIAG IV INF INIT: CPT

## 2018-01-01 PROCEDURE — 72125 CT NECK SPINE W/O DYE: CPT

## 2018-01-01 PROCEDURE — 83735 ASSAY OF MAGNESIUM: CPT | Performed by: EMERGENCY MEDICINE

## 2018-01-01 PROCEDURE — 99232 SBSQ HOSP IP/OBS MODERATE 35: CPT | Performed by: NURSE PRACTITIONER

## 2018-01-01 PROCEDURE — 85007 BL SMEAR W/DIFF WBC COUNT: CPT | Performed by: EMERGENCY MEDICINE

## 2018-01-01 PROCEDURE — 82607 VITAMIN B-12: CPT | Performed by: PHYSICIAN ASSISTANT

## 2018-01-01 PROCEDURE — 84443 ASSAY THYROID STIM HORMONE: CPT | Performed by: PHYSICIAN ASSISTANT

## 2018-01-01 PROCEDURE — 86850 RBC ANTIBODY SCREEN: CPT | Performed by: EMERGENCY MEDICINE

## 2018-01-01 PROCEDURE — 87040 BLOOD CULTURE FOR BACTERIA: CPT | Performed by: EMERGENCY MEDICINE

## 2018-01-01 PROCEDURE — 99223 1ST HOSP IP/OBS HIGH 75: CPT | Performed by: NEUROLOGICAL SURGERY

## 2018-01-01 PROCEDURE — 96372 THER/PROPH/DIAG INJ SC/IM: CPT

## 2018-01-01 PROCEDURE — 99232 SBSQ HOSP IP/OBS MODERATE 35: CPT | Performed by: SURGERY

## 2018-01-01 PROCEDURE — G8988 SELF CARE GOAL STATUS: HCPCS

## 2018-01-01 PROCEDURE — 72128 CT CHEST SPINE W/O DYE: CPT

## 2018-01-01 PROCEDURE — 86901 BLOOD TYPING SEROLOGIC RH(D): CPT | Performed by: EMERGENCY MEDICINE

## 2018-01-01 PROCEDURE — 84439 ASSAY OF FREE THYROXINE: CPT | Performed by: EMERGENCY MEDICINE

## 2018-01-01 PROCEDURE — 80053 COMPREHEN METABOLIC PANEL: CPT | Performed by: SURGERY

## 2018-01-01 PROCEDURE — 94660 CPAP INITIATION&MGMT: CPT

## 2018-01-01 PROCEDURE — 84145 PROCALCITONIN (PCT): CPT | Performed by: INTERNAL MEDICINE

## 2018-01-01 PROCEDURE — 94760 N-INVAS EAR/PLS OXIMETRY 1: CPT

## 2018-01-01 PROCEDURE — 81001 URINALYSIS AUTO W/SCOPE: CPT | Performed by: EMERGENCY MEDICINE

## 2018-01-01 PROCEDURE — 80047 BASIC METABLC PNL IONIZED CA: CPT

## 2018-01-01 PROCEDURE — 82248 BILIRUBIN DIRECT: CPT | Performed by: EMERGENCY MEDICINE

## 2018-01-01 PROCEDURE — 80048 BASIC METABOLIC PNL TOTAL CA: CPT | Performed by: INTERNAL MEDICINE

## 2018-01-01 PROCEDURE — G8987 SELF CARE CURRENT STATUS: HCPCS

## 2018-01-01 PROCEDURE — 93005 ELECTROCARDIOGRAM TRACING: CPT | Performed by: SURGERY

## 2018-01-01 PROCEDURE — 72131 CT LUMBAR SPINE W/O DYE: CPT

## 2018-01-01 PROCEDURE — 84443 ASSAY THYROID STIM HORMONE: CPT | Performed by: EMERGENCY MEDICINE

## 2018-01-01 PROCEDURE — 36415 COLL VENOUS BLD VENIPUNCTURE: CPT | Performed by: EMERGENCY MEDICINE

## 2018-01-01 PROCEDURE — 99233 SBSQ HOSP IP/OBS HIGH 50: CPT | Performed by: NEUROLOGICAL SURGERY

## 2018-01-01 PROCEDURE — 85014 HEMATOCRIT: CPT

## 2018-01-01 PROCEDURE — 71045 X-RAY EXAM CHEST 1 VIEW: CPT

## 2018-01-01 PROCEDURE — 73070 X-RAY EXAM OF ELBOW: CPT

## 2018-01-01 PROCEDURE — G8978 MOBILITY CURRENT STATUS: HCPCS

## 2018-01-01 PROCEDURE — 99223 1ST HOSP IP/OBS HIGH 75: CPT | Performed by: INTERNAL MEDICINE

## 2018-01-01 PROCEDURE — 85027 COMPLETE CBC AUTOMATED: CPT | Performed by: SURGERY

## 2018-01-01 PROCEDURE — 82805 BLOOD GASES W/O2 SATURATION: CPT | Performed by: EMERGENCY MEDICINE

## 2018-01-01 PROCEDURE — 94762 N-INVAS EAR/PLS OXIMTRY CONT: CPT

## 2018-01-01 RX ORDER — ESCITALOPRAM OXALATE 10 MG/1
15 TABLET ORAL DAILY
COMMUNITY
End: 2018-05-12 | Stop reason: HOSPADM

## 2018-01-01 RX ORDER — ONDANSETRON 2 MG/ML
4 INJECTION INTRAMUSCULAR; INTRAVENOUS EVERY 6 HOURS PRN
Status: DISCONTINUED | OUTPATIENT
Start: 2018-01-01 | End: 2018-01-01 | Stop reason: HOSPADM

## 2018-01-01 RX ORDER — LIDOCAINE HYDROCHLORIDE AND EPINEPHRINE 10; 10 MG/ML; UG/ML
20 INJECTION, SOLUTION INFILTRATION; PERINEURAL ONCE
Status: COMPLETED | OUTPATIENT
Start: 2018-01-01 | End: 2018-01-01

## 2018-01-01 RX ORDER — ONDANSETRON 2 MG/ML
4 INJECTION INTRAMUSCULAR; INTRAVENOUS EVERY 6 HOURS PRN
Status: DISCONTINUED | OUTPATIENT
Start: 2018-01-01 | End: 2018-05-12 | Stop reason: HOSPADM

## 2018-01-01 RX ORDER — POTASSIUM CHLORIDE 20 MEQ/1
60 TABLET, EXTENDED RELEASE ORAL ONCE
Status: COMPLETED | OUTPATIENT
Start: 2018-01-01 | End: 2018-01-01

## 2018-01-01 RX ORDER — MORPHINE SULFATE 2 MG/ML
2 INJECTION, SOLUTION INTRAMUSCULAR; INTRAVENOUS
Status: DISCONTINUED | OUTPATIENT
Start: 2018-01-01 | End: 2018-01-01

## 2018-01-01 RX ORDER — VANCOMYCIN HYDROCHLORIDE 1 G/200ML
1000 INJECTION, SOLUTION INTRAVENOUS EVERY 24 HOURS
Status: DISCONTINUED | OUTPATIENT
Start: 2018-01-01 | End: 2018-01-01

## 2018-01-01 RX ORDER — SODIUM CHLORIDE 9 MG/ML
125 INJECTION, SOLUTION INTRAVENOUS CONTINUOUS
Status: DISCONTINUED | OUTPATIENT
Start: 2018-01-01 | End: 2018-01-01 | Stop reason: HOSPADM

## 2018-01-01 RX ORDER — SODIUM CHLORIDE 9 MG/ML
50 INJECTION, SOLUTION INTRAVENOUS CONTINUOUS
Status: DISCONTINUED | OUTPATIENT
Start: 2018-01-01 | End: 2018-01-01

## 2018-01-01 RX ORDER — SODIUM CHLORIDE, SODIUM GLUCONATE, SODIUM ACETATE, POTASSIUM CHLORIDE, MAGNESIUM CHLORIDE, SODIUM PHOSPHATE, DIBASIC, AND POTASSIUM PHOSPHATE .53; .5; .37; .037; .03; .012; .00082 G/100ML; G/100ML; G/100ML; G/100ML; G/100ML; G/100ML; G/100ML
1000 INJECTION, SOLUTION INTRAVENOUS ONCE
Status: COMPLETED | OUTPATIENT
Start: 2018-01-01 | End: 2018-01-01

## 2018-01-01 RX ORDER — ONDANSETRON 2 MG/ML
4 INJECTION INTRAMUSCULAR; INTRAVENOUS ONCE
Status: COMPLETED | OUTPATIENT
Start: 2018-01-01 | End: 2018-01-01

## 2018-01-01 RX ORDER — SODIUM CHLORIDE 9 MG/ML
1000 INJECTION, SOLUTION INTRAVENOUS CONTINUOUS
Status: DISCONTINUED | OUTPATIENT
Start: 2018-01-01 | End: 2018-01-01

## 2018-01-01 RX ORDER — PHYTONADIONE 5 MG/1
5 TABLET ORAL DAILY
Status: DISCONTINUED | OUTPATIENT
Start: 2018-01-01 | End: 2018-01-01 | Stop reason: HOSPADM

## 2018-01-01 RX ORDER — LEVETIRACETAM 500 MG/1
500 TABLET ORAL EVERY 12 HOURS SCHEDULED
Qty: 14 TABLET | Refills: 0 | Status: SHIPPED | OUTPATIENT
Start: 2018-01-01 | End: 2018-05-12 | Stop reason: HOSPADM

## 2018-01-01 RX ORDER — SPIRONOLACTONE 25 MG/1
25 TABLET ORAL DAILY
Status: DISCONTINUED | OUTPATIENT
Start: 2018-01-01 | End: 2018-01-01 | Stop reason: HOSPADM

## 2018-01-01 RX ORDER — GINSENG 100 MG
1 CAPSULE ORAL ONCE
Status: COMPLETED | OUTPATIENT
Start: 2018-01-01 | End: 2018-01-01

## 2018-01-01 RX ORDER — LEVETIRACETAM 100 MG/ML
500 SOLUTION ORAL EVERY 12 HOURS SCHEDULED
Status: DISCONTINUED | OUTPATIENT
Start: 2018-01-01 | End: 2018-01-01

## 2018-01-01 RX ORDER — ACETAMINOPHEN 325 MG/1
650 TABLET ORAL EVERY 8 HOURS SCHEDULED
Status: DISCONTINUED | OUTPATIENT
Start: 2018-01-01 | End: 2018-01-01 | Stop reason: HOSPADM

## 2018-01-01 RX ORDER — POTASSIUM CHLORIDE 20 MEQ/1
40 TABLET, EXTENDED RELEASE ORAL ONCE
Status: COMPLETED | OUTPATIENT
Start: 2018-01-01 | End: 2018-01-01

## 2018-01-01 RX ORDER — SODIUM CHLORIDE, SODIUM GLUCONATE, SODIUM ACETATE, POTASSIUM CHLORIDE, MAGNESIUM CHLORIDE, SODIUM PHOSPHATE, DIBASIC, AND POTASSIUM PHOSPHATE .53; .5; .37; .037; .03; .012; .00082 G/100ML; G/100ML; G/100ML; G/100ML; G/100ML; G/100ML; G/100ML
250 INJECTION, SOLUTION INTRAVENOUS ONCE
Status: DISCONTINUED | OUTPATIENT
Start: 2018-01-01 | End: 2018-01-01

## 2018-01-01 RX ORDER — CARVEDILOL 6.25 MG/1
6.25 TABLET ORAL 2 TIMES DAILY WITH MEALS
Status: DISCONTINUED | OUTPATIENT
Start: 2018-01-01 | End: 2018-01-01

## 2018-01-01 RX ORDER — AMOXICILLIN 250 MG
1 CAPSULE ORAL
Qty: 30 TABLET | Refills: 0 | Status: SHIPPED | OUTPATIENT
Start: 2018-01-01 | End: 2018-05-12 | Stop reason: HOSPADM

## 2018-01-01 RX ORDER — VANCOMYCIN HYDROCHLORIDE 1 G/200ML
1000 INJECTION, SOLUTION INTRAVENOUS EVERY 12 HOURS
Status: DISCONTINUED | OUTPATIENT
Start: 2018-01-01 | End: 2018-01-01

## 2018-01-01 RX ORDER — ESCITALOPRAM OXALATE 10 MG/1
15 TABLET ORAL DAILY
Status: DISCONTINUED | OUTPATIENT
Start: 2018-01-01 | End: 2018-01-01

## 2018-01-01 RX ORDER — AMOXICILLIN 250 MG
1 CAPSULE ORAL
Status: DISCONTINUED | OUTPATIENT
Start: 2018-01-01 | End: 2018-01-01 | Stop reason: HOSPADM

## 2018-01-01 RX ORDER — LEVETIRACETAM 500 MG/1
500 TABLET ORAL EVERY 12 HOURS SCHEDULED
Status: DISCONTINUED | OUTPATIENT
Start: 2018-01-01 | End: 2018-01-01 | Stop reason: HOSPADM

## 2018-01-01 RX ORDER — POTASSIUM CHLORIDE 14.9 MG/ML
20 INJECTION INTRAVENOUS ONCE
Status: DISCONTINUED | OUTPATIENT
Start: 2018-01-01 | End: 2018-01-01 | Stop reason: HOSPADM

## 2018-01-01 RX ORDER — ONDANSETRON 2 MG/ML
INJECTION INTRAMUSCULAR; INTRAVENOUS
Status: DISPENSED
Start: 2018-01-01 | End: 2018-01-01

## 2018-01-01 RX ORDER — NITROGLYCERIN 0.4 MG/1
0.4 TABLET SUBLINGUAL
Status: DISCONTINUED | OUTPATIENT
Start: 2018-01-01 | End: 2018-01-01

## 2018-01-01 RX ORDER — MORPHINE SULFATE 4 MG/ML
4 INJECTION, SOLUTION INTRAMUSCULAR; INTRAVENOUS
Status: DISCONTINUED | OUTPATIENT
Start: 2018-01-01 | End: 2018-05-12 | Stop reason: HOSPADM

## 2018-01-01 RX ORDER — MAGNESIUM SULFATE HEPTAHYDRATE 40 MG/ML
2 INJECTION, SOLUTION INTRAVENOUS ONCE
Status: COMPLETED | OUTPATIENT
Start: 2018-01-01 | End: 2018-01-01

## 2018-01-01 RX ORDER — PHENOBARBITAL SODIUM 65 MG/ML
30 INJECTION INTRAMUSCULAR EVERY 6 HOURS PRN
Status: DISCONTINUED | OUTPATIENT
Start: 2018-01-01 | End: 2018-05-12 | Stop reason: HOSPADM

## 2018-01-01 RX ORDER — LEVETIRACETAM 500 MG/1
500 TABLET ORAL EVERY 12 HOURS SCHEDULED
Qty: 14 TABLET | Refills: 0 | Status: CANCELLED | OUTPATIENT
Start: 2018-01-01 | End: 2018-01-01

## 2018-01-01 RX ORDER — MORPHINE SULFATE 2 MG/ML
1 INJECTION, SOLUTION INTRAMUSCULAR; INTRAVENOUS EVERY 4 HOURS PRN
Status: DISCONTINUED | OUTPATIENT
Start: 2018-01-01 | End: 2018-01-01

## 2018-01-01 RX ORDER — CARVEDILOL 6.25 MG/1
6.25 TABLET ORAL 2 TIMES DAILY WITH MEALS
Status: DISCONTINUED | OUTPATIENT
Start: 2018-01-01 | End: 2018-01-01 | Stop reason: HOSPADM

## 2018-01-01 RX ORDER — PHYTONADIONE 5 MG/1
5 TABLET ORAL DAILY
Qty: 30 TABLET | Refills: 0 | Status: SHIPPED | OUTPATIENT
Start: 2018-01-01 | End: 2018-01-01

## 2018-01-01 RX ORDER — FUROSEMIDE 40 MG/1
40 TABLET ORAL 2 TIMES DAILY
Status: DISCONTINUED | OUTPATIENT
Start: 2018-01-01 | End: 2018-01-01 | Stop reason: HOSPADM

## 2018-01-01 RX ORDER — ACETAMINOPHEN 325 MG/1
650 TABLET ORAL EVERY 6 HOURS PRN
Status: DISCONTINUED | OUTPATIENT
Start: 2018-01-01 | End: 2018-01-01

## 2018-01-01 RX ORDER — POTASSIUM CHLORIDE 20 MEQ/1
20 TABLET, EXTENDED RELEASE ORAL ONCE
Status: COMPLETED | OUTPATIENT
Start: 2018-01-01 | End: 2018-01-01

## 2018-01-01 RX ORDER — ACETAMINOPHEN 325 MG/1
650 TABLET ORAL EVERY 6 HOURS PRN
Qty: 30 TABLET | Refills: 0 | Status: SHIPPED | OUTPATIENT
Start: 2018-01-01 | End: 2018-05-12 | Stop reason: HOSPADM

## 2018-01-01 RX ORDER — PHYTONADIONE 10 MG/ML
10 INJECTION, EMULSION INTRAMUSCULAR; INTRAVENOUS; SUBCUTANEOUS ONCE
Status: COMPLETED | OUTPATIENT
Start: 2018-01-01 | End: 2018-01-01

## 2018-01-01 RX ADMIN — PHYTONADIONE 5 MG: 5 TABLET ORAL at 09:19

## 2018-01-01 RX ADMIN — SPIRONOLACTONE 25 MG: 25 TABLET, FILM COATED ORAL at 10:00

## 2018-01-01 RX ADMIN — MORPHINE SULFATE 4 MG: 4 INJECTION INTRAVENOUS at 16:17

## 2018-01-01 RX ADMIN — PHYTONADIONE 5 MG: 5 TABLET ORAL at 13:50

## 2018-01-01 RX ADMIN — VANCOMYCIN HYDROCHLORIDE 1750 MG: 1 INJECTION, POWDER, LYOPHILIZED, FOR SOLUTION INTRAVENOUS at 17:51

## 2018-01-01 RX ADMIN — ESCITALOPRAM OXALATE 15 MG: 10 TABLET, FILM COATED ORAL at 09:02

## 2018-01-01 RX ADMIN — POTASSIUM CHLORIDE 20 MEQ: 1500 TABLET, EXTENDED RELEASE ORAL at 11:33

## 2018-01-01 RX ADMIN — CARBIDOPA AND LEVODOPA 1 TABLET: 25; 100 TABLET ORAL at 15:51

## 2018-01-01 RX ADMIN — SODIUM CHLORIDE 50 ML/HR: 0.9 INJECTION, SOLUTION INTRAVENOUS at 17:15

## 2018-01-01 RX ADMIN — PIPERACILLIN SODIUM,TAZOBACTAM SODIUM 2.25 G: 2; .25 INJECTION, POWDER, FOR SOLUTION INTRAVENOUS at 13:06

## 2018-01-01 RX ADMIN — ACETAMINOPHEN 650 MG: 325 TABLET, FILM COATED ORAL at 06:30

## 2018-01-01 RX ADMIN — LEVETIRACETAM 500 MG: 100 SOLUTION ORAL at 21:21

## 2018-01-01 RX ADMIN — ACETAMINOPHEN 650 MG: 325 TABLET, FILM COATED ORAL at 05:09

## 2018-01-01 RX ADMIN — CARBIDOPA AND LEVODOPA 1 TABLET: 25; 100 TABLET ORAL at 10:00

## 2018-01-01 RX ADMIN — POTASSIUM CHLORIDE 60 MEQ: 1500 TABLET, EXTENDED RELEASE ORAL at 13:50

## 2018-01-01 RX ADMIN — Medication 1 SMALL APPLICATION: at 10:27

## 2018-01-01 RX ADMIN — DESMOPRESSIN ACETATE 21.2 MCG: 4 SOLUTION INTRAVENOUS at 13:16

## 2018-01-01 RX ADMIN — CARBIDOPA AND LEVODOPA 1 TABLET: 25; 100 TABLET ORAL at 21:22

## 2018-01-01 RX ADMIN — Medication 1 TABLET: at 22:12

## 2018-01-01 RX ADMIN — CARBIDOPA AND LEVODOPA 1 TABLET: 25; 100 TABLET ORAL at 17:30

## 2018-01-01 RX ADMIN — LEVETIRACETAM 500 MG: 500 TABLET ORAL at 22:12

## 2018-01-01 RX ADMIN — PIPERACILLIN SODIUM,TAZOBACTAM SODIUM 2.25 G: 2; .25 INJECTION, POWDER, FOR SOLUTION INTRAVENOUS at 05:11

## 2018-01-01 RX ADMIN — MAGNESIUM SULFATE HEPTAHYDRATE 2 G: 40 INJECTION, SOLUTION INTRAVENOUS at 17:28

## 2018-01-01 RX ADMIN — CARBIDOPA AND LEVODOPA 1 TABLET: 25; 100 TABLET ORAL at 17:23

## 2018-01-01 RX ADMIN — SODIUM CHLORIDE 50 ML/HR: 0.9 INJECTION, SOLUTION INTRAVENOUS at 23:25

## 2018-01-01 RX ADMIN — LEVETIRACETAM 500 MG: 500 TABLET ORAL at 09:03

## 2018-01-01 RX ADMIN — PHYTONADIONE 10 MG: 10 INJECTION, EMULSION INTRAMUSCULAR; INTRAVENOUS; SUBCUTANEOUS at 10:52

## 2018-01-01 RX ADMIN — Medication 1 TABLET: at 22:07

## 2018-01-01 RX ADMIN — SODIUM CHLORIDE, SODIUM GLUCONATE, SODIUM ACETATE, POTASSIUM CHLORIDE, MAGNESIUM CHLORIDE, SODIUM PHOSPHATE, DIBASIC, AND POTASSIUM PHOSPHATE 1000 ML: .53; .5; .37; .037; .03; .012; .00082 INJECTION, SOLUTION INTRAVENOUS at 17:44

## 2018-01-01 RX ADMIN — ONDANSETRON 4 MG: 2 INJECTION INTRAMUSCULAR; INTRAVENOUS at 16:37

## 2018-01-01 RX ADMIN — PIPERACILLIN SODIUM,TAZOBACTAM SODIUM 2.25 G: 2; .25 INJECTION, POWDER, FOR SOLUTION INTRAVENOUS at 23:28

## 2018-01-01 RX ADMIN — LEVETIRACETAM 500 MG: 100 SOLUTION ORAL at 11:10

## 2018-01-01 RX ADMIN — PROTHROMBIN, COAGULATION FACTOR VII HUMAN, COAGULATION FACTOR IX HUMAN, COAGULATION FACTOR X HUMAN, PROTEIN C, PROTEIN S HUMAN, AND WATER 3322 UNITS: KIT at 13:55

## 2018-01-01 RX ADMIN — LEVETIRACETAM 500 MG: 500 TABLET ORAL at 09:19

## 2018-01-01 RX ADMIN — PHYTONADIONE 5 MG: 5 TABLET ORAL at 09:04

## 2018-01-01 RX ADMIN — ACETAMINOPHEN 650 MG: 325 TABLET, FILM COATED ORAL at 13:52

## 2018-01-01 RX ADMIN — CARBIDOPA AND LEVODOPA 1 TABLET: 25; 100 TABLET ORAL at 09:19

## 2018-01-01 RX ADMIN — ACETAMINOPHEN 650 MG: 325 TABLET, FILM COATED ORAL at 22:12

## 2018-01-01 RX ADMIN — ESCITALOPRAM OXALATE 15 MG: 10 TABLET, FILM COATED ORAL at 10:00

## 2018-01-01 RX ADMIN — LEVETIRACETAM 500 MG: 500 TABLET ORAL at 22:07

## 2018-01-01 RX ADMIN — FUROSEMIDE 40 MG: 40 TABLET ORAL at 10:00

## 2018-01-01 RX ADMIN — POTASSIUM CHLORIDE 40 MEQ: 1500 TABLET, EXTENDED RELEASE ORAL at 17:23

## 2018-01-01 RX ADMIN — ACETAMINOPHEN 650 MG: 325 TABLET, FILM COATED ORAL at 13:50

## 2018-01-01 RX ADMIN — CARBIDOPA AND LEVODOPA 1 TABLET: 25; 100 TABLET ORAL at 22:12

## 2018-01-01 RX ADMIN — ACETAMINOPHEN 650 MG: 325 TABLET, FILM COATED ORAL at 22:07

## 2018-01-01 RX ADMIN — SODIUM CHLORIDE 125 ML/HR: 0.9 INJECTION, SOLUTION INTRAVENOUS at 11:34

## 2018-01-01 RX ADMIN — POTASSIUM CHLORIDE 20 MEQ: 200 INJECTION, SOLUTION INTRAVENOUS at 11:33

## 2018-01-01 RX ADMIN — MORPHINE SULFATE 1 MG: 2 INJECTION, SOLUTION INTRAMUSCULAR; INTRAVENOUS at 01:02

## 2018-01-01 RX ADMIN — CARVEDILOL 6.25 MG: 6.25 TABLET, FILM COATED ORAL at 10:00

## 2018-01-01 RX ADMIN — CARBIDOPA AND LEVODOPA 1 TABLET: 25; 100 TABLET ORAL at 22:07

## 2018-01-01 RX ADMIN — ESCITALOPRAM OXALATE 15 MG: 10 TABLET, FILM COATED ORAL at 09:19

## 2018-01-01 RX ADMIN — MORPHINE SULFATE 2 MG: 2 INJECTION, SOLUTION INTRAMUSCULAR; INTRAVENOUS at 08:04

## 2018-01-01 RX ADMIN — MORPHINE SULFATE 4 MG: 4 INJECTION INTRAVENOUS at 11:41

## 2018-01-01 RX ADMIN — PIPERACILLIN SODIUM AND TAZOBACTAM SODIUM 3.38 G: 36; 4.5 INJECTION, POWDER, FOR SOLUTION INTRAVENOUS at 17:27

## 2018-01-01 RX ADMIN — ACETAMINOPHEN 650 MG: 325 TABLET, FILM COATED ORAL at 03:17

## 2018-01-01 RX ADMIN — SODIUM CHLORIDE 1000 ML/HR: 0.9 INJECTION, SOLUTION INTRAVENOUS at 16:37

## 2018-01-01 RX ADMIN — LIDOCAINE HYDROCHLORIDE AND EPINEPHRINE 20 ML: 10; 10 INJECTION, SOLUTION INFILTRATION; PERINEURAL at 10:09

## 2018-01-01 RX ADMIN — CARBIDOPA AND LEVODOPA 1 TABLET: 25; 100 TABLET ORAL at 09:03

## 2018-04-24 PROBLEM — I25.10 CAD (CORONARY ARTERY DISEASE): Status: ACTIVE | Noted: 2018-01-01

## 2018-04-24 PROBLEM — S06.360A INTRAPARENCHYMAL HEMATOMA OF BRAIN (HCC): Status: ACTIVE | Noted: 2018-01-01

## 2018-04-24 PROBLEM — G20 PARKINSON DISEASE (HCC): Status: ACTIVE | Noted: 2018-01-01

## 2018-04-24 PROBLEM — Z95.810 HISTORY OF IMPLANTABLE CARDIOVERTER-DEFIBRILLATOR (ICD) PLACEMENT: Status: ACTIVE | Noted: 2018-01-01

## 2018-04-24 PROBLEM — I48.91 ATRIAL FIBRILLATION (HCC): Status: ACTIVE | Noted: 2018-01-01

## 2018-04-24 PROBLEM — S06.5X9A SDH (SUBDURAL HEMATOMA) (HCC): Status: ACTIVE | Noted: 2018-01-01

## 2018-04-24 PROBLEM — E11.9 DIABETES MELLITUS TYPE 2, DIET-CONTROLLED (HCC): Status: ACTIVE | Noted: 2018-01-01

## 2018-04-24 PROBLEM — I50.9 CHF (CONGESTIVE HEART FAILURE) (HCC): Status: ACTIVE | Noted: 2018-01-01

## 2018-04-24 NOTE — SOCIAL WORK
CM met with pt to discuss the role of CM  Pt lives alone in a 1st floor home which has 2STE  Pt drives and was fully independent PTA  Pt owns a C-Pap and gets his supplies through [address] has a living will at home  Pt states no hx of substance abuse  Pt states minor depression managed by his PCP  Pt has no hx of IP rehab  CM will follow   Pt would like to use the Meds to [facility]

## 2018-04-24 NOTE — MEDICAL STUDENT
Consultation - Neurosurgery   Komal Arshad 68 y o  male MRN: [id number]  Unit/Bed#: ED 09 Encounter: [id number]      Assessment / Plan:  1  Acute fall with LOC  · PT/OT evaluation  2  Head laceration  · Wound care and management by trauma team  3  Mild left frontal and left parietal subdural hematoma  · CTH from today revealed mild left/right frontal subdural hematoma and left parietal subdural hematoma  Xray of C-Spine revealed no deformities/fractures  · Repeat CT of head w/o contrast for tomorrow (4/25) to assess progress the progress of brain bleeding  · STAT CT if GCS decreases by 2 points within an hour  GCS was 14 at exam today  · Keppra BID for 1 week as seizure prophylaxis  · SBP goal of < 160 mmHg  4  Mild right frontal subdural hematoma  · See above  5  Atrial fibrillation  · INR upon admission was > 9  · Vitamin K/DDAVP/K-centra provided, hold Warfin, reduce therapeutic INR to less than 3  · Cardiology consult due to [address] of ablation x 6 weeks  6  Medical Management  · CHF / CAD  · Cardiac pacemaker   · Type 2 Diabetes  · HTN / Hyperlipidemia  · Pain management consider muscle relaxant for right neck soreness    No neurosurgical management necessary at this time  History of Present Illness   HPI: Komal Arshad is a 68y o  year old male who presents with a fall this morning  The patient reports that he was walking to [facility] to obtain his CPAP machine  He tripped over a large curb causing him to fall backwards  The patient struck the back of his head and had a loss of consciousness for a period of minutes  He awoke in the presence of EMS and was transported to [facility] and subsequently transported to [facility]  The patient has no complaints at this time other than being hungry and some head soreness  Consults    Review of Systems   Constitutional: Negative for fatigue and fever  HENT: Positive for hearing loss (Chronic, L > R)   Negative for ear pain and tinnitus  Eyes: Positive for visual disturbance ("fuzzy" vision bilaterally)  Negative for pain  Respiratory: Positive for shortness of breath  Negative for apnea, cough, chest tightness and wheezing  Cardiovascular: Positive for chest pain  Negative for palpitations and leg swelling  Gastrointestinal: Negative for abdominal pain, constipation, diarrhea, nausea and vomiting  Genitourinary: Positive for difficulty urinating (Hesitancy)  Negative for hematuria and urgency  Musculoskeletal: Positive for gait problem and neck pain  Negative for back pain, joint swelling and neck stiffness  Neurological: Positive for tremors  Negative for seizures, syncope, speech difficulty, weakness, light-headedness, numbness and headaches         Historical Information   Past Medical History:   Diagnosis Date    Arthritis     Cardiac disease     [facility]     Coronary artery disease     Diabetes mellitus ([facility] )     Hyperlipidemia     Hypertension     Stroke ([facility] )     7 months ago     Past Surgical History:   Procedure Laterality Date    CARDIAC DEFIBRILLATOR PLACEMENT      with multiple revisions    CARDIAC ELECTROPHYSIOLOGY STUDY AND ABLATION      4/2018    CHOLECYSTECTOMY      CORONARY ANGIOPLASTY WITH STENT PLACEMENT      age 77    EYE SURGERY Bilateral     cataract    JOINT REPLACEMENT Right     knee x 10    LUMBAR SPINE SURGERY      unknown      History   Alcohol Use    Yes     Comment: social     History   Drug Use No     History   Smoking Status    Former Smoker   Smokeless Tobacco    Never Used     Family History   Problem Relation Age of Onset    Hypertension Father     Stroke Father     Stroke Brother        Meds/Allergies   all current active meds have been reviewed  Allergies   Allergen Reactions    Valium [Diazepam] Other (See Comments)     anger       Objective   No intake or output data in the 24 hours ending 04/24/18 1459    Physical Exam Constitutional: No distress  HENT:   Head: Normocephalic  Head is with laceration  Head is without raccoon's eyes and without Desai's sign  Eyes: EOM are normal  Pupils are equal, round, and reactive to light  Neck: Normal range of motion  Tenderness to palpation along right anterior neck   Pulmonary/Chest: No respiratory distress  He has no wheezes  He exhibits no tenderness  Shortness of breath   Abdominal: Soft  He exhibits no distension  There is no tenderness  There is no rebound and no guarding  Musculoskeletal: Normal range of motion  Cervical back: He exhibits no tenderness, no bony tenderness, no swelling and no edema  Thoracic back: He exhibits no tenderness, no bony tenderness, no swelling and no edema  Lumbar back: He exhibits no tenderness, no bony tenderness, no swelling and no edema  Neurological: He has a normal Finger-Nose-Finger Test    Reflex Scores:       Bicep reflexes are 2+ on the right side and 2+ on the left side  Brachioradialis reflexes are 2+ on the right side and 2+ on the left side  Patellar reflexes are 2+ on the right side and 2+ on the left side  Achilles reflexes are 2+ on the right side and 2+ on the left side  Skin: Skin is warm and dry  Abrasion noted  He is not diaphoretic  Psychiatric: He has a normal mood and affect  His speech is normal and behavior is normal  Judgment and thought content normal      Neurologic Exam     Mental Status   Disoriented to person  Disoriented to place  Disoriented to year and month  Registration: recalls 3 of 3 objects  Follows 1 step commands  Attention: normal  Concentration: normal    Speech: speech is normal   Level of consciousness: alert  Knowledge: good  Able to perform simple calculations  Able to name object  Able to repeat  Normal comprehension       Cranial Nerves     CN II   Visual acuity: normal with correction  Right visual field deficit: none  Left visual field deficit: none     CN III, IV, VI   Pupils are equal, round, and reactive to light  Extraocular motions are normal    Right pupil: Size: 3 mm  Shape: regular  Reactivity: brisk  Consensual response: intact  Left pupil: Size: 3 mm  Shape: regular  Reactivity: brisk  Consensual response: intact  CN III: no CN III palsy  CN VI: no CN VI palsy  Nystagmus: none   Diplopia: none  Ophthalmoparesis: none  Upgaze: normal  Downgaze: normal  Conjugate gaze: present    CN V   Right facial sensation deficit: none  Left facial sensation deficit: none    CN VII   Right facial weakness: none  Left facial weakness: none    CN VIII   Hearing: impaired    CN XI   Right trapezius strength: normal  Left trapezius strength: normal    CN XII   Tongue: not atrophic  Tongue deviation: none    Motor Exam   Muscle bulk: decreased  Overall muscle tone: normal  Right arm pronator drift: absent  Left arm pronator drift: absent    Strength   Strength 5/5 except as noted  Knee extension 4/5 bilaterally     Sensory Exam   Right leg proprioception: normal  Left leg proprioception: normal  Right arm pinprick: normal  Left arm pinprick: normal  Right leg pinprick: normal  Left leg pinprick: normal    Gait, Coordination, and Reflexes     Coordination   Finger to nose coordination: normal    Tremor   Resting tremor: present (Right leg resting tremor)    Reflexes   Right brachioradialis: 2+  Left brachioradialis: 2+  Right biceps: 2+  Left biceps: 2+  Right patellar: 2+  Left patellar: 2+  Right achilles: 2+  Left achilles: 2+  Right ankle clonus: absent  Left ankle clonus: absent      Vitals:Blood pressure 101/71, pulse 101, temperature 98 4 °F (36 9 °C), temperature source Oral, resp  rate 20, weight 70 8 kg (156 lb), SpO2 96 %  ,Body mass index is 20 58 kg/m²  Lab Results: I have personally reviewed pertinent results        Imaging Studies: I have personally reviewed pertinent films in PACS    EKG, Pathology, and Other Studies: I have personally reviewed pertinent reports        VTE Prophylaxis: Sequential compression device Ivelisse Melendez) ordered    Code Status: Prior  Advance Directive and Living Will:      Power of :  Ed   POLST:

## 2018-04-24 NOTE — ED PROVIDER NOTES
History  Chief Complaint   Patient presents with    Fall - Major     Patient tripped and fell backwards and hit his head  Laceration noted on the back L side of his head  Abrasion to L shouklder area and L elbow  Pt denies any neck, back or LOC       Fall - Major       Prior to Admission Medications   Prescriptions Last Dose Informant Patient Reported? Taking?   aspirin (ECOTRIN LOW STRENGTH) 81 mg EC tablet   Yes No   Sig: Take 81 mg by mouth daily   calcium carbonate-vitamin D (OSCAL-D) 500 mg-200 units per tablet   Yes No   Sig: Take 1 tablet by mouth 2 (two) times a day with meals   carbidopa-levodopa (SINEMET)  mg per tablet   Yes No   Sig: Take 1 tablet by mouth 3 (three) times a day   carvedilol (COREG) 6 25 mg tablet   Yes No   Sig: Take 6 25 mg by mouth 2 (two) times a day with meals   ezetimibe-simvastatin (VYTORIN) 10-20 mg per tablet   Yes No   Sig: Take 1 tablet by mouth daily at bedtime   furosemide (LASIX) 40 mg tablet   Yes No   Sig: Take 40 mg by mouth 2 (two) times a day Pt only takes when he gains more than two pounds a day   lisinopril (ZESTRIL) 2 5 mg tablet   Yes No   Sig: Take 2 5 mg by mouth daily   multivitamin (THERAGRAN) TABS   Yes No   Sig: Take 1 tablet by mouth daily   nitroglycerin (NITROSTAT) 0 4 mg SL tablet   Yes No   Sig: Place 0 4 mg under the tongue every 5 (five) minutes as needed for chest pain   oxyCODONE (OxyCONTIN) 10 mg 12 hr tablet   Yes No   Sig: Take 15 mg by mouth every 4 (four) hours as needed   spironolactone (ALDACTONE) 25 mg tablet   Yes No   Sig: Take 25 mg by mouth daily   warfarin (COUMADIN) 2 5 mg tablet   Yes No   Sig: Take 2 5 mg by mouth daily   warfarin (COUMADIN) 5 mg tablet   Yes No   Sig: Take 5 mg by mouth daily 2 5mg every day but friday Friday he takes 5mg      Facility-Administered Medications: None       Past Medical History:   Diagnosis Date    Arthritis     Cardiac disease     [facility]     Coronary artery disease     [facility]     Hyperlipidemia     Hypertension     Stroke [facility])        Past Surgical History:   Procedure Laterality Date    CHOLECYSTECTOMY      EYE SURGERY      JOINT REPLACEMENT         History reviewed  No pertinent family history  I have reviewed and agree with the history as documented      Social History   Substance Use Topics    Smoking status: Former Smoker    Smokeless tobacco: Never Used    Alcohol use No        Review of Systems    Physical Exam  ED Triage Vitals [04/24/18 0944]   Temperature Pulse Respirations Blood Pressure SpO2   98 4 °F (36 9 °C) 91 20 99/71 100 %      Temp src Heart Rate Source Patient Position - Orthostatic VS BP Location FiO2 (%)   -- -- -- -- --      Pain Score       4           Orthostatic Vital Signs  Vitals:    04/24/18 0944   BP: 99/71   Pulse: 91       Physical Exam    ED Medications  Medications - No data to display    Diagnostic Studies  Results Reviewed     Procedure Component Value Units Date/Time    Protime-INR [[id number]]     Lab Status:  No result Specimen:  Blood                  CT head without contrast    (Results Pending)   CT cervical spine without contrast    (Results Pending)              Procedures  ECG 12 Lead Documentation  Date/Time: 4/24/2018 9:46 AM  Performed by: Deion Linda  Authorized by: Deion Linda     ECG reviewed by me, the ED Provider: yes    Patient location:  ED  Previous ECG:     Previous ECG:  Unavailable  Interpretation:     Interpretation: abnormal    Rate:     ECG rate:  92    ECG rate assessment: normal    Rhythm:     Rhythm: paced    Pacing:     Capture:  Complete           Phone Contacts  ED Phone Contact    ED Course  ED Course                                [facility]    Disposition  Final diagnoses:   None     ED Disposition     None      Follow-up Information    None       Patient's Medications   Discharge Prescriptions    No medications on file     No discharge procedures on file      ED Provider  Electronically Signed by           Kevin James MD  04/24/18 6168

## 2018-04-24 NOTE — ED PROVIDER NOTES
H&P Exam - Trauma   Figueroa Acosta 68 y o  male MRN: [id number]  Unit/Bed#: ED 06/ED 06 Encounter: [id number]    Assessment/Plan   Trauma Alert: Trauma Acuity: Trauma Evaluation  Model of Arrival: Trauma Mode of Arrival: ALS via    Trauma Team: Current Providers  Attending Provider: Jimmie Arthur MD  Registered Nurse: Justin Barbosa RN  Consultants: None    Trauma Active Problems: head injury    Trauma Plan: CT, PT/INR    Chief Complaint:   Chief Complaint   Patient presents with    Fall - Major     Patient tripped and fell backwards and hit his head  Laceration noted on the back L side of his head  Abrasion to L shouklder area and L elbow  Pt denies any neck, back or LOC       History of Present Illness   HPI:  Figueroa Acosta is a 68 y o  male who presents with Head injury  Mechanism:Details of Incident: Patient tripped while stepping up on a curb an d fell backwards striking his head and L elbow Injury Date: 04/24/18 Injury Time: 0930        Fall - Major   Associated symptoms: headaches (scalp lac)      Review of Systems   Neurological: Positive for headaches (scalp lac)  All other systems reviewed and are negative  Historical Information     Immunizations: There is no immunization history on file for this patient  Past Medical History:   Diagnosis Date    Arthritis     Cardiac disease     [facility]     Coronary artery disease     Diabetes mellitus ([facility]     Hyperlipidemia     Hypertension     Stroke [facility])      History reviewed  No pertinent family history    Past Surgical History:   Procedure Laterality Date    CHOLECYSTECTOMY      EYE SURGERY      JOINT REPLACEMENT       Social History     Social History    Marital status: Single     Spouse name: N/A    Number of children: N/A    Years of education: N/A     Social History Main Topics    Smoking status: Former Smoker    Smokeless tobacco: Never Used    Alcohol use No    Drug use: No    Sexual activity: No     Other Topics Concern    None     Social History Narrative    None       Family History: non-contributory    Meds/Allergies   Prior to Admission Medications   Prescriptions Last Dose Informant Patient Reported? Taking?   aspirin (ECOTRIN LOW STRENGTH) 81 mg EC tablet   Yes Yes   Sig: Take 81 mg by mouth daily   calcium carbonate-vitamin D (OSCAL-D) 500 mg-200 units per tablet   Yes Yes   Sig: Take 1 tablet by mouth 2 (two) times a day with meals   carbidopa-levodopa (SINEMET)  mg per tablet   Yes Yes   Sig: Take 1 tablet by mouth 3 (three) times a day   carvedilol (COREG) 6 25 mg tablet   Yes Yes   Sig: Take 6 25 mg by mouth 2 (two) times a day with meals   escitalopram (LEXAPRO) 10 mg tablet   Yes Yes   Sig: Take 15 mg by mouth daily   ezetimibe-simvastatin (VYTORIN) 10-20 mg per tablet   Yes Yes   Sig: Take 1 tablet by mouth daily at bedtime   furosemide (LASIX) 40 mg tablet   Yes Yes   Sig: Take 40 mg by mouth 2 (two) times a day Pt only takes when he gains more than two pounds a day   lisinopril (ZESTRIL) 2 5 mg tablet   Yes Yes   Sig: Take 2 5 mg by mouth daily   multivitamin (THERAGRAN) TABS   Yes Yes   Sig: Take 1 tablet by mouth daily   nitroglycerin (NITROSTAT) 0 4 mg SL tablet   Yes Yes   Sig: Place 0 4 mg under the tongue every 5 (five) minutes as needed for chest pain   spironolactone (ALDACTONE) 25 mg tablet   Yes Yes   Sig: Take 25 mg by mouth daily   warfarin (COUMADIN) 2 5 mg tablet   Yes Yes   Sig: Take 1 25 mg by mouth daily     warfarin (COUMADIN) 5 mg tablet   Yes Yes   Sig: Take 2 5 mg by mouth daily 2 5mg every day but friday Friday he takes 5mg        Facility-Administered Medications: None       Allergies   Allergen Reactions    Valium [Diazepam] Other (See Comments)     anger       PHYSICAL EXAM    PE limited by:     Objective   Vitals:   First set: Temperature: 98 4 °F (36 9 °C) (04/24/18 0944)  Pulse: 91 (04/24/18 0944)  Respirations: 20 (04/24/18 0944)  Blood Pressure: 99/71 (04/24/18 0944)  SpO2: 100 % (04/24/18 0944)    Primary Survey:   (A) Airway:   (B) Breathing:   (C) Circulation: Pulses:   normal  (D) Disabliity:  GCS Total:  15  (E) Expose:  Completed    Secondary Survey: (Click on Physical Exam tab above)  Physical Exam   Constitutional: He is oriented to person, place, and time  He appears well-developed and well-nourished  HENT:   Nose: Nose normal    Mouth/Throat: Oropharynx is clear and moist    Eyes: Pupils are equal, round, and reactive to light  Neck: Normal range of motion  Cardiovascular: Normal rate  Pulmonary/Chest: Effort normal and breath sounds normal    Abdominal: Soft  Neurological: He is alert and oriented to person, place, and time  Skin: Skin is warm and dry  Psychiatric: He has a normal mood and affect  His behavior is normal  Judgment and thought content normal        Invasive Devices          No matching active lines, drains, or airways          Lab Results:   Results Reviewed     Procedure Component Value Units Date/Time    Protime-INR [[id number]] Collected:  04/24/18 0958    Lab Status:  No result Specimen:  Blood from Arm, Right                  Imaging Studies:   CT head without contrast    (Results Pending)   CT cervical spine without contrast    (Results Pending)       Other Studies:     Code Status: Prior  Advance Directive and Living Will:      Power of :    POLST:      Procedures  Lac Repair  Date/Time: 4/24/2018 10:35 AM  Performed by: Selina Pillai  Authorized by: Selina Pillai   Consent: Verbal consent obtained    Body area: head/neck  Location details: scalp  Laceration length: 2 cm  Vascular damage: no  Anesthesia: local infiltration    Anesthesia:  Local Anesthetic: lidocaine 1% with epinephrine  Anesthetic total: 5 mL    Sedation:  Patient sedated: no    Wound Dehiscence:  Superficial Wound Dehiscence: simple closure      Procedure Details:  Preparation: Patient was prepped and draped in the usual sterile fashion  Irrigation solution: saline  Skin closure: staples  Number of sutures: 5  Technique: simple  Approximation: close  Approximation difficulty: simple  Dressing: antibiotic ointment  Patient tolerance: Patient tolerated the procedure well with no immediate complications             Phone Contacts  ED Phone Contact     ED Course  ED Course          [facility]  [facility] Time    Disposition  Final diagnoses:   None     ED Disposition     None      Follow-up Information    None       Patient's Medications   Discharge Prescriptions    No medications on file     No discharge procedures on file        ED Provider  Electronically Signed by         Milan Cortes MD  04/24/18 3540

## 2018-04-24 NOTE — EMTALA/ACUTE CARE TRANSFER
[facility] [address]  [id number]  Dept: [address] [place] [address] CONSENT    NAME Mack COSBY 1940                              MRN [id number]    I have been informed of my rights regarding examination, treatment, and transfer   by Dr Sofia Mcallister MD    Benefits:      Risks:        Consent for Transfer:  I acknowledge that my medical condition has been evaluated and explained to me by the emergency department physician or other qualified medical person and/or my attending physician, who has recommended that I be transferred to the service of  Accepting Physician: Toño Rivero at    The above potential benefits of such transfer, the potential risks associated with such transfer, and the probable risks of not being transferred have been explained to me, and I fully understand them  The doctor has explained that, in my case, the benefits of transfer outweigh the risks  I agree to be transferred  I authorize the performance of emergency medical procedures and treatments upon me in both transit and upon arrival at the receiving facility  Additionally, I authorize the release of any and all medical records to the receiving facility and request they be transported with me, if possible  I understand that the safest mode of transportation during a medical emergency is an ambulance and that the Hospital advocates the use of this mode of transport  Risks of traveling to the receiving facility by car, including absence of medical control, life sustaining equipment, such as oxygen, and medical personnel has been explained to me and I fully understand them  (OTILIA CORRECT BOX BELOW)  [  ]  I consent to the stated transfer and to be transported by ambulance/helicopter    [  ]  I consent to the stated transfer, but refuse transportation by ambulance and accept full responsibility for my transportation by car  I understand the risks of non-ambulance transfers and I exonerate the Hospital and its staff from any deterioration in my condition that results from this refusal     X___________________________________________    DATE  18  TIME________  Signature of patient or legally responsible individual signing on patient behalf           RELATIONSHIP TO PATIENT_________________________          Provider Certification    NAME Samara Pham                                         1940                              MRN [id number]    A medical screening exam was performed on the above named patient  Based on the examination:    Condition Necessitating Transfer The encounter diagnosis was Intracerebral hemorrhage ([address] )  Patient Condition: The patient has been stabilized such that within reasonable medical probability, no material deterioration of the patient condition or the condition of the unborn child(latonya) is likely to result from the transfer    Reason for Transfer: Level of Care needed not available at this facility    Transfer Requirements: Facility     · Space available and qualified personnel available for treatment as acknowledged by    · Agreed to accept transfer and to provide appropriate medical treatment as acknowledged by       Iraida  · Appropriate medical records of the examination and treatment of the patient are provided at the time of transfer   [address], [address] _______  · Transfer will be performed by qualified personnel from    and appropriate transfer equipment as required, including the use of necessary and appropriate life support measures      Provider Certification: I have examined the patient and explained the following risks and benefits of being transferred/refusing transfer to the patient/family:         Based on these reasonable risks and benefits to the patient and/or the unborn child(latonya), and based upon the information available at the time of the patients examination, I certify that the medical benefits reasonably to be expected from the provision of appropriate medical treatments at another medical facility outweigh the increasing risks, if any, to the individuals medical condition, and in the case of labor to the unborn child, from effecting the transfer      X____________________________________________ DATE 04/24/18        TIME_______      ORIGINAL - SEND TO MEDICAL RECORDS   COPY - SEND WITH PATIENT DURING TRANSFER

## 2018-04-24 NOTE — RESPIRATORY THERAPY NOTE
RT Protocol Note  Vinny Screen 68 y o  male MRN: [id number]  Unit/Bed#: [facility] 907-01 Encounter: [id number]    Assessment    Principal Problem:    SDH (subdural hematoma) (HCC)  Active Problems:    Intraparenchymal hematoma of brain (HCC)    Atrial fibrillation (HCC)    Parkinson disease ([place] [address] )    Diabetes mellitus type 2, diet-controlled ([place] [address] )    CAD (coronary artery disease)    History of implantable cardioverter-defibrillator (ICD) placement    CHF (congestive heart failure) ([place] [address] )      Home Pulmonary Medications:  none       Past Medical History:   Diagnosis Date    Arthritis     Cardiac disease     CHF (congestive heart failure) ([place] [address] )     Coronary artery disease     Diabetes mellitus ([place] [address] )     Hyperlipidemia     Hypertension     Stroke ([place] [address] )     7 months ago     Social History     Social History    Marital status: Single     Spouse name: N/A    Number of children: N/A    Years of education: N/A     Social History Main Topics    Smoking status: Former Smoker    Smokeless tobacco: Never Used    Alcohol use Yes      Comment: social    Drug use: No    Sexual activity: No     Other Topics Concern    None     Social History Narrative    None       Subjective    Subjective Data: pt wears cpap at home    Objective    Physical Exam:   Assessment Type: Assess only  General Appearance: Alert, Awake  Respiratory Pattern: Normal  Chest Assessment: Chest expansion symmetrical  Bilateral Breath Sounds: Clear  Cough: Non-productive  O2 Device: rma    Vitals:  Blood pressure 106/64, pulse 104, temperature 99 4 °F (37 4 °C), temperature source Oral, resp  rate 22, height 6' 1" (1 854 m), weight 71 2 kg (156 lb 15 5 oz), SpO2 99 %  Imaging and other studies: I have personally reviewed pertinent reports  O2 Device: rma     Plan    Respiratory Plan: Discontinue Protocol        Resp Comments:  evaluated per protocol  pt is lying in bed showing no signs of resp distress   pt has hist of SPIKE and wear cpap of +5cmh20 and ramps up to +20 cmh2o  pt has no other pulm history and doesn't take any pulm meds at home  I instructed IS with pt and is able to perform by himself   no other resp intervention needed at this time

## 2018-04-24 NOTE — H&P
H&P Exam - Trauma   Luis Alberto De Jesus 68 y o  male MRN: [id number]  Unit/Bed#: ED 09 Encounter: [id number]    Assessment/Plan   Trauma Alert: Evaluation  Model of Arrival: Ambulance  Trauma Team: Attending Nicole Faust and Residents Nanette Oakes MD  Consultants: Neurosurgery: Kp  Time Called 1:01pm    Trauma Active Problems:   Subdural hematoma  Coumadin coagulopathy  Fall from standing    Trauma Plan:   Admit to trauma HOT protocol  Emergent coumadin reversal w/ [address]  DDAVP for h/o ASA use  Q1H neuro exams  Repeat CT in AM  NPO  IVF  Hold A/C and DVT ppx    Chief Complaint:     History of Present Illness   HPI:  Luis Alberto De Jesus is a 68 y o  male who presents with fall from standing  Patient states he suffered a mechanical fall at approximately 9:00 a m  this morning  Patient states he slept on a curb and fell backwards striking the back of his head  Patient did lose consciousness  Patient denies headaches, vision changes, nausea vomiting, numbness or weakness  Patient does take Coumadin for history of atrial fibrillation and CVA  Additionally, patient also takes aspirin 81  Patient presented to [facility] Internal Medicine see Department where he was GCS 15  He underwent CT scan of his head and C-spine that demonstrated a left frontal and parietal subdural hematoma  There is no evidence of midline shift at that point  Cervical spine showed no acute injuries  It was found, that the patient had a Coumadin coagulopathy, with INR of 9 07  He received 1 dose of vitamin K at [facility] prior to transfer  Review of Systems   Constitutional: Negative  HENT: Negative  Eyes: Negative  Respiratory: Negative  Cardiovascular: Negative  Gastrointestinal: Positive for abdominal pain  Endocrine: Negative  Genitourinary: Negative  Musculoskeletal: Negative  Skin:        Abrasions   Allergic/Immunologic: Negative  Neurological:        +LOC   Hematological: Negative  Psychiatric/Behavioral: Negative  Historical Information     Past Medical History:   Diagnosis Date    Arthritis     Cardiac disease     [facility]est[facility]) [facility] [address] [facility]     Coronary artery disease     Diabetes mellitus ([facility] )     Hyperlipidemia     Hypertension     Stroke [facility])      Past Surgical History:   Procedure Laterality Date    CHOLECYSTECTOMY      EYE SURGERY      JOINT REPLACEMENT       Social History   History   Alcohol Use No     History   Drug Use No     History   Smoking Status    Former Smoker   Smokeless Tobacco    Never Used       There is no immunization history on file for this patient  Last Tetanus:  Unknown  Family History: Non-contributory      Meds/Allergies   all current active meds have been reviewed, current meds:   Current Facility-Administered Medications   Medication Dose Route Frequency    desmopressin (DDAVP) 21 2 mcg in sodium chloride 0 9 % 50 mL IVPB  0 3 mcg/kg Intravenous Once    prothrombin complex conc human (KCENTRA) 3,322 Units  3,322 Units Intravenous Once    and PTA meds:   Prior to Admission Medications   Prescriptions Last Dose Informant Patient Reported?  Taking?   aspirin (ECOTRIN LOW STRENGTH) 81 mg EC tablet 4/24/2018 at Unknown time  Yes Yes   Sig: Take 81 mg by mouth daily   calcium carbonate-vitamin D (OSCAL-D) 500 mg-200 units per tablet 4/24/2018 at Unknown time  Yes Yes   Sig: Take 1 tablet by mouth 2 (two) times a day with meals   carbidopa-levodopa (SINEMET)  mg per tablet 4/24/2018 at Unknown time  Yes Yes   Sig: Take 1 tablet by mouth 3 (three) times a day   carvedilol (COREG) 6 25 mg tablet 4/24/2018 at Unknown time  Yes Yes   Sig: Take 6 25 mg by mouth 2 (two) times a day with meals   escitalopram (LEXAPRO) 10 mg tablet 4/24/2018 at Unknown time  Yes Yes   Sig: Take 15 mg by mouth daily   ezetimibe-simvastatin (VYTORIN) 10-20 mg per tablet 4/24/2018 at Unknown time  Yes Yes   Sig: Take 1 tablet by mouth daily at bedtime furosemide (LASIX) 40 mg tablet 4/24/2018 at Unknown time  Yes Yes   Sig: Take 40 mg by mouth 2 (two) times a day Pt only takes when he gains more than two pounds a day   lisinopril (ZESTRIL) 2 5 mg tablet 4/24/2018 at Unknown time  Yes Yes   Sig: Take 2 5 mg by mouth daily   multivitamin (THERAGRAN) TABS 4/24/2018 at Unknown time  Yes Yes   Sig: Take 1 tablet by mouth daily   nitroglycerin (NITROSTAT) 0 4 mg SL tablet 4/24/2018 at Unknown time  Yes Yes   Sig: Place 0 4 mg under the tongue every 5 (five) minutes as needed for chest pain   spironolactone (ALDACTONE) 25 mg tablet 4/24/2018 at Unknown time  Yes Yes   Sig: Take 25 mg by mouth daily   warfarin (COUMADIN) 2 5 mg tablet 4/24/2018 at Unknown time  Yes Yes   Sig: Take 1 25 mg by mouth daily     warfarin (COUMADIN) 5 mg tablet 4/24/2018 at Unknown time  Yes Yes   Sig: Take 2 5 mg by mouth daily 2 5mg every day but friday Friday he takes 5mg        Facility-Administered Medications: None       Allergies   Allergen Reactions    Valium [Diazepam] Other (See Comments)     anger         PHYSICAL EXAM    Objective   Vitals:   First set: Temperature: 98 4 °F (36 9 °C) (04/24/18 1233)  Pulse: 96 (04/24/18 1233)  Respirations: 20 (04/24/18 1233)  Blood Pressure: 122/76 (04/24/18 1233)    Primary Survey:   (A) Airway:  Patent  (B) Breathing:  Normal  (C) Circulation: Pulses:  Intact  (D) Disabliity:  GCS Total:  15 pupils 4 mm and reactive bilaterally  (E) Expose:  Completed    Secondary Survey: (Click on Physical Exam tab above)  Physical Exam   Constitutional: He is oriented to person, place, and time  He appears well-developed  No distress  HENT:   Left occipital head laceration, stapled  Abrasion to left occiput   Eyes: Pupils are equal, round, and reactive to light  Neck: Normal range of motion  Cardiovascular: Normal rate and regular rhythm  Pulmonary/Chest: Effort normal and breath sounds normal  He exhibits no tenderness  Abdominal: Soft   He exhibits no distension  There is no tenderness  Musculoskeletal: Normal range of motion  He exhibits no edema or deformity  Neurological: He is alert and oriented to person, place, and time  He exhibits normal muscle tone  Skin: Skin is warm and dry  Abrasions to left shoulder, left knee       Invasive Devices     Peripheral Intravenous Line            Peripheral IV 04/24/18 Right Antecubital less than 1 day                Lab Results:   BMP/CMP:   Lab Results   Component Value Date     04/24/2018    K 2 6 (LL) 04/24/2018     04/24/2018    CO2 31 04/24/2018    ANIONGAP 10 04/24/2018    BUN 15 04/24/2018    CREATININE 1 22 04/24/2018    GLUCOSE 159 (H) 04/24/2018    CALCIUM 8 4 04/24/2018    AST 12 04/24/2018    ALT 9 (L) 04/24/2018    ALKPHOS 109 04/24/2018    PROT 6 7 04/24/2018    BILITOT 2 50 (H) 04/24/2018    EGFR 72 04/24/2018   , CBC:   Lab Results   Component Value Date    WBC 5 90 04/24/2018    HGB 12 2 04/24/2018    HCT 33 5 (L) 04/24/2018    MCV 84 04/24/2018     04/24/2018    MCH 26 4 (L) 04/24/2018    MCHC 31 3 (L) 04/24/2018    RDW 22 7 (H) 04/24/2018    MPV 10 2 04/24/2018    and Coagulation:   Lab Results   Component Value Date    INR 9 07 (HH) 04/24/2018     Imaging/EKG Studies:  Ct Head Without Contrast    Result Date: 4/24/2018  Narrative: CT BRAIN - WITHOUT CONTRAST INDICATION:   fall, on coumadin  COMPARISON:  CT head 9/15/2017 TECHNIQUE:  CT examination of the brain was performed  In addition to axial images, coronal 2D reformatted images were created and submitted for interpretation  Radiation dose length product (DLP) for this visit:  1035 94 mGy-cm   This examination, like all CT scans performed in the [facility], was performed utilizing techniques to minimize radiation dose exposure, including the use of iterative reconstruction and automated exposure control  IMAGE QUALITY:  Diagnostic   FINDINGS: PARENCHYMA:  No intracranial mass, mass effect or midline shift  No CT signs of acute infarction  No acute intracranial hemorrhage  Periventricular, centrum semiovale, and subcortical white matter hypodensity is a nonspecific finding likely representing chronic microangiopathy  Small old infarction with encephalomalacia left frontal operculum  Calcification bilateral cavernous internal carotid and distal vertebral arteries  VENTRICLES AND EXTRA-AXIAL SPACES:  6 x 5 mm mid parafalcine hemorrhage  This is superior to the 3rd ventricle  Minimal left anterolateral frontal subarachnoid hemorrhage  There is a sliver of left anterior frontal subdural hemorrhage maximum thickness 2 mm  Minimal left parietal subdural hemorrhage maximum thickness 3 to 4 mm  Right posterior frontal sliver of hyperdensity likely a cortical vessel 28 and 29 series 2  No acute hydrocephalus  VISUALIZED ORBITS AND PARANASAL SINUSES:  Changes of bilateral lens replacements noted  Severe chronic opacification of the left maxillary sinus with partial dehiscence of the ostiomeatal unit  Moderate left mid and anterior ethmoid sinus mucosal thickening  Trace mucosal thickening right maxillary sinus  CALVARIUM AND EXTRACRANIAL SOFT TISSUES:  No skull fracture  Chronic punctate metallic densities in the preauricular soft tissues  Mild left parietal scalp soft tissue swelling/contusion  Impression: Mild left parietal scalp soft tissue swelling/contusion  6 x 5 mm mid parafalcine hemorrhage  This is superior to the 3rd ventricle  Minimal left anterolateral frontal subarachnoid hemorrhage  Left anterior frontal subdural hemorrhage maximum thickness 2 mm  Minimal left parietal subdural hemorrhage maximum thickness 3 to 4 mm  This is over the posterior convexity underlying the scalp contusion  No acute skull fracture  Chronic microangiopathy   I personally discussed this study with Yary Pineda on 4/24/2018 at 10:14 AM  Workstation performed: [id number]     Ct Cervical Spine Without Contrast    Result Date: 4/24/2018  Narrative: CT CERVICAL SPINE - WITHOUT CONTRAST INDICATION:   fall, on coumadin  COMPARISON: None  TECHNIQUE:  CT examination of the cervical spine was performed without intravenous contrast   Contiguous axial images were obtained  Sagittal and coronal reconstructions were performed  Radiation dose length product (DLP) for this visit:  477 27 mGy-cm   This examination, like all CT scans performed in the [facility], was performed utilizing techniques to minimize radiation dose exposure, including the use of iterative  reconstruction and automated exposure control  IMAGE QUALITY:  Diagnostic  FINDINGS: ALIGNMENT:  There is straightening of normal cervical lordosis  No subluxation or compression deformity  Mild levoconvex lower cervical scoliosis  VERTEBRAL BODIES:  No fracture  DEGENERATIVE CHANGES:  Moderate multilevel cervical degenerative changes are noted  No critical central canal stenosis  Moderate multilevel neuroforaminal narrowing most prominent on the right at C3-4 through C5-6  PREVERTEBRAL AND PARASPINAL SOFT TISSUES: No prevertebral soft tissue swelling  Bilateral carotid artery calcification  Heterogeneous nodular partially visualized thyroid gland  Nodules measure less than 1 cm  Incidental discovery of one or more thyroid nodule(s) measuring less than 1 5 cm and without suspicious features is noted in this patient who is above 28years old; according to guidelines published in the February 2015 white paper on incidental thyroid nodules in the Journal of the [employer] of Radiology Coleen Goo), no further evaluation is recommended  THORACIC INLET:  Trace biapical pleural parenchymal scarring and emphysematous disease  Few scattered biapical 2 mm and less nodules  Left chest wall cardiac pacer leads are present  Impression: No cervical spine fracture or traumatic malalignment  Moderate multilevel cervical degenerative changes  2 mm and less biapical lung nodules in retrospect unchanged since September 15, 2017  Based on current Fleischner Society 2017 Guidelines on incidental pulmonary nodule, no routine follow-up is needed if the patient is considered low risk for lung cancer  If the patient is considered high risk for lung cancer, 12 month follow-up non-contrast chest CT is recommended     Workstation performed: [id number]      Code Status: Prior  Advance Directive and Living Will:      Power of :    POLST:

## 2018-04-24 NOTE — TRAUMA DOCUMENTATION
Trauma resident Dr Tomas [place] made aware of pt's critical lab results of PT of 69 7 seconds and INR of 8 14

## 2018-04-24 NOTE — CONSULTS
Consultation - Neurosurgery   Alex Espinoza 68 y o  male MRN: [id number]  Unit/Bed#: ED 09 Encounter: [id number]  Consult completed on 4/24/18 at 1320      Inpatient consult to Neurosurgery  Consult performed by: Tima Driscoll ordered by: Linda Rasheed          Assessment/Plan     Assessment:  1  Bilateral frontal subdural hematomas  2  Left parietal subdural hematoma   3  Mild parafalcine hemorrhage  4  S/p mechanical fall with unknown LOC  5  History of afib on Coumadin  6  Hypertension  7  Hyperlipidemia  8  History of defibrillator    Plan:  · Exam: GCS 14 (-1 confusion) AAOx2, disoriented to time  GRAHAM wo focal weakness  No PD, kearney or clonus  Finger to nose slight dysmetria  · Imaging: personally reviewed on 4/24/18:  · 4/24 - CT head wo: bilateral frontal subdural hematoma, left parietal subdural hematoma, mild parafalcine hemorrhage  · 4/24 - CT c-spine wo: no cervical fractures or malalignment   · Repeat CT head tomorrow or STAT if decline in GCS >2 pts/ 1hr  · INR on admit >9 - given Vitamin K at [facility] and DDAVP given in addition on 4/24/18   · Continue to trend INR  · SBP goal <160  · Keppra 500mg q12h for seizure ppx x 1 week post-trauma  · DVT ppx: SCDs, hold AC/AP secondary to subdural/subarachnoid hemorrhage  · Medical management per primary team  · PT/OT - from nsx standpoint okay to mobilize as tolerated  Pending evaluation  · Recommend cardiology consult secondary to history of cardiac ablation within 6 weeks  · No neurosurgical intervention at this time, will continue to close monitor   Will be admitted under HOT protocol       History of Present Illness   HPI: Aelx Espinoza is a 68y o  year old male with PMH including hypertension, hyperlipidemia, diabetes mellitus, CHF, atrial fibrillation s/p cardiac ablation 6 weeks ago on coumadin, history of a stroke approximately 7 months ago on aspirin 81mg who presents with complaint of mechanical fall backwards around 0900 this morning  He states he was lifting his leg to step up on a curb and fell backwards  Initially the patient denied LOC but later states he believes he did loss consciousness  He was evaluated at [facility], a CT scan was performed showing a small bifrontal and left parietal subdural hematoma  His INR on admission was >9, at [facility] he was given vitamin K  He was transferred to [facility] for further evaluation  The patient lives alone, he denies use of a walker or cane  He admits to having an other prior fall approximately 3 5 months ago but prior only 1-2 in the past total  He admits to taking coumadin  Patient denies taking more than prescribed  His friend and Lin Jalloh present who admits that patient has been more fatigued lately, sleeping 15+ hours  He states he they'll speak on the phone patient has intermittently confusion which has been present over the past few years  Review of Systems   Constitutional: Positive for fatigue  Negative for activity change  HENT: Negative for tinnitus and trouble swallowing  Eyes: Positive for visual disturbance  Negative for photophobia  Respiratory: Positive for shortness of breath  Cardiovascular: Positive for chest pain  Gastrointestinal: Negative for abdominal pain, constipation, diarrhea, nausea and vomiting  Genitourinary: Negative for dysuria, frequency and urgency  Musculoskeletal: Negative for back pain and neck pain  Skin: Positive for wound  Negative for rash  Allergic/Immunologic: Negative for environmental allergies and food allergies  Neurological: Negative for dizziness, weakness, numbness and headaches  Psychiatric/Behavioral: Positive for dysphoric mood  Negative for self-injury and suicidal ideas         Historical Information   Past Medical History:   Diagnosis Date    Arthritis     Cardiac disease     [facility]congestive heart failure) [facility] )     Coronary artery disease     Diabetes mellitus ([facility] )     Hyperlipidemia     Hypertension     Stroke [facility])      Past Surgical History:   Procedure Laterality Date    CHOLECYSTECTOMY      EYE SURGERY      JOINT REPLACEMENT       History   Alcohol Use No     History   Drug Use No     History   Smoking Status    Former Smoker   Smokeless Tobacco    Never Used     History reviewed  No pertinent family history  Meds/Allergies   all current active meds have been reviewed and current meds:   Current Facility-Administered Medications   Medication Dose Route Frequency    desmopressin (DDAVP) 21 2 mcg in sodium chloride 0 9 % 50 mL IVPB  0 3 mcg/kg Intravenous Once    prothrombin complex conc human (KCENTRA) 3,322 Units  3,322 Units Intravenous Once     Allergies   Allergen Reactions    Valium [Diazepam] Other (See Comments)     anger       Objective   I/O     None          Physical Exam   Constitutional: He appears well-developed and well-nourished  HENT:   Head: Normocephalic  Left occipital laceration closed with staples    Eyes: EOM are normal  Pupils are equal, round, and reactive to light  Neck: Normal range of motion  No spinous process tenderness present  Cardiovascular: Tachycardia present  Pulmonary/Chest: Effort normal  No respiratory distress  Abdominal: Soft  There is no tenderness  There is no rebound  Musculoskeletal:        Cervical back: He exhibits no tenderness  Thoracic back: He exhibits no tenderness  Lumbar back: He exhibits no tenderness  Neurological: He is alert  Skin: Skin is warm  Left elbow abrasion with gauze dressing present  Psychiatric: His behavior is normal  Thought content normal      Neurologic Exam     Cranial Nerves     CN III, IV, VI   Pupils are equal, round, and reactive to light  Extraocular motions are normal        Vitals:Blood pressure 128/71, pulse 100, temperature 98 4 °F (36 9 °C), temperature source Oral, resp  rate 20, weight 70 8 kg (156 lb), SpO2 98 %  ,Body mass index is 20 58 kg/m²  Lab Results:     Results from last 7 days  Lab Units 04/24/18  1038 04/24/18  1032   WBC Thousand/uL  --  5 90   HEMOGLOBIN g/dL  --  10 5*   I STAT HEMOGLOBIN g/dl 12 2  --    HEMATOCRIT %  --  33 5*   PLATELETS Thousands/uL  --  250   NEUTROS PCT %  --  57   MONOS PCT %  --  9       Results from last 7 days  Lab Units 04/24/18  1038 04/24/18  1032   SODIUM mmol/L  --  143   POTASSIUM mmol/L  --  2 6*   CHLORIDE mmol/L  --  102   CO2 mmol/L  --  31   BUN mg/dL  --  15   CREATININE mg/dL  --  1 22   CALCIUM mg/dL  --  8 4   TOTAL PROTEIN g/dL  --  6 7   BILIRUBIN TOTAL mg/dL  --  2 50*   ALK PHOS U/L  --  109   ALT U/L  --  9*   AST U/L  --  12   GLUCOSE RANDOM mg/dL  --  163*   GLUCOSE, ISTAT mg/dl 159*  --                Results from last 7 days  Lab Units 04/24/18  0958   INR  9 07*     No results found for: TROPONINT  ABG:No results found for: PHART, ILZ4MTX, PO2ART, AFX5HVH, I4HNOPGD, BEART, SOURCE    Imaging Studies: I have personally reviewed pertinent reports  and I have personally reviewed pertinent films in PACS    Ct Head Without Contrast    Result Date: 4/24/2018  Narrative: CT BRAIN - WITHOUT CONTRAST INDICATION:   fall, on coumadin  COMPARISON:  CT head 9/15/2017 TECHNIQUE:  CT examination of the brain was performed  In addition to axial images, coronal 2D reformatted images were created and submitted for interpretation  Radiation dose length product (DLP) for this visit:  1035 94 mGy-cm   This examination, like all CT scans performed in the [facility], was performed utilizing techniques to minimize radiation dose exposure, including the use of iterative reconstruction and automated exposure control  IMAGE QUALITY:  Diagnostic  FINDINGS: PARENCHYMA:  No intracranial mass, mass effect or midline shift  No CT signs of acute infarction  No acute intracranial hemorrhage    Periventricular, centrum semiovale, and subcortical white matter hypodensity is a nonspecific finding likely representing chronic microangiopathy  Small old infarction with encephalomalacia left frontal operculum  Calcification bilateral cavernous internal carotid and distal vertebral arteries  VENTRICLES AND EXTRA-AXIAL SPACES:  6 x 5 mm mid parafalcine hemorrhage  This is superior to the 3rd ventricle  Minimal left anterolateral frontal subarachnoid hemorrhage  There is a sliver of left anterior frontal subdural hemorrhage maximum thickness 2 mm  Minimal left parietal subdural hemorrhage maximum thickness 3 to 4 mm  Right posterior frontal sliver of hyperdensity likely a cortical vessel 28 and 29 series 2  No acute hydrocephalus  VISUALIZED ORBITS AND PARANASAL SINUSES:  Changes of bilateral lens replacements noted  Severe chronic opacification of the left maxillary sinus with partial dehiscence of the ostiomeatal unit  Moderate left mid and anterior ethmoid sinus mucosal thickening  Trace mucosal thickening right maxillary sinus  CALVARIUM AND EXTRACRANIAL SOFT TISSUES:  No skull fracture  Chronic punctate metallic densities in the preauricular soft tissues  Mild left parietal scalp soft tissue swelling/contusion  Impression: Mild left parietal scalp soft tissue swelling/contusion  6 x 5 mm mid parafalcine hemorrhage  This is superior to the 3rd ventricle  Minimal left anterolateral frontal subarachnoid hemorrhage  Left anterior frontal subdural hemorrhage maximum thickness 2 mm  Minimal left parietal subdural hemorrhage maximum thickness 3 to 4 mm  This is over the posterior convexity underlying the scalp contusion  No acute skull fracture  Chronic microangiopathy  I personally discussed this study with Pita Cornejo on 4/24/2018 at 10:14 AM  Workstation performed: [id number]     Ct Cervical Spine Without Contrast    Result Date: 4/24/2018  Narrative: CT CERVICAL SPINE - WITHOUT CONTRAST INDICATION:   fall, on coumadin  COMPARISON: None   TECHNIQUE:  CT examination of the cervical spine was performed without intravenous contrast   Contiguous axial images were obtained  Sagittal and coronal reconstructions were performed  Radiation dose length product (DLP) for this visit:  477 27 mGy-cm   This examination, like all CT scans performed in the [facility], was performed utilizing techniques to minimize radiation dose exposure, including the use of iterative  reconstruction and automated exposure control  IMAGE QUALITY:  Diagnostic  FINDINGS: ALIGNMENT:  There is straightening of normal cervical lordosis  No subluxation or compression deformity  Mild levoconvex lower cervical scoliosis  VERTEBRAL BODIES:  No fracture  DEGENERATIVE CHANGES:  Moderate multilevel cervical degenerative changes are noted  No critical central canal stenosis  Moderate multilevel neuroforaminal narrowing most prominent on the right at C3-4 through C5-6  PREVERTEBRAL AND PARASPINAL SOFT TISSUES: No prevertebral soft tissue swelling  Bilateral carotid artery calcification  Heterogeneous nodular partially visualized thyroid gland  Nodules measure less than 1 cm  Incidental discovery of one or more thyroid nodule(s) measuring less than 1 5 cm and without suspicious features is noted in this patient who is above 28years old; according to guidelines published in the February 2015 white paper on incidental thyroid nodules in the Journal of the [employer] of Radiology [facility]), no further evaluation is recommended  THORACIC INLET:  Trace biapical pleural parenchymal scarring and emphysematous disease  Few scattered biapical 2 mm and less nodules  Left chest wall cardiac pacer leads are present  Impression: No cervical spine fracture or traumatic malalignment  Moderate multilevel cervical degenerative changes  2 mm and less biapical lung nodules in retrospect unchanged since September 15, 2017   Based on current Fleischner Society 2017 Guidelines on incidental pulmonary nodule, no routine follow-up is needed if the patient is considered low risk for lung cancer  If the patient is considered high risk for lung cancer, 12 month follow-up non-contrast chest CT is recommended  Workstation performed: [id number]     EKG, Pathology, and Other Studies: I have personally reviewed pertinent reports     and I have personally reviewed pertinent films in PACS    VTE Prophylaxis: Sequential compression device (Venodyne)  and Reason for no pharmacologic prophylaxis SDH/SAH    Code Status: Prior  Advance Directive and Living Will:      Power of :    POLST:

## 2018-04-25 PROBLEM — G31.84 MCI (MILD COGNITIVE IMPAIRMENT): Status: ACTIVE | Noted: 2018-01-01

## 2018-04-25 PROBLEM — R53.81 PHYSICAL DECONDITIONING: Status: ACTIVE | Noted: 2018-01-01

## 2018-04-25 PROBLEM — E46 MALNUTRITION (HCC): Status: ACTIVE | Noted: 2018-01-01
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
## 2018-04-25 PROBLEM — R26.2 AMBULATORY DYSFUNCTION: Status: ACTIVE | Noted: 2018-01-01

## 2018-04-25 NOTE — DISCHARGE INSTRUCTIONS
[facility] Emergency Department    [address]    [facility] [address]    Phone:  [phone]    Fax:  [phone]                                       Mike Alfred   MRN: [id number]    Department:  [facility] Emergency Department   Date of Visit:  4/10/2017           After Visit Summary Signature Page     I have received my discharge instructions, and my questions have been answered. I have discussed any challenges I see with this plan with the nurse or doctor.    ..........................................................................................................................................  Patient/Patient Representative Signature      ..........................................................................................................................................  Patient Representative Print Name and Relationship to Patient    ..................................................               ................................................  Date                                            Time    ..........................................................................................................................................  Reviewed by Signature/Title    ...................................................              ..............................................  Date                                                            Time           Do not take any blood thinning medications (ie  No Advil  No motrin  No ibuprofen  No Aleve  No Aspirin  No fishoil  No heparin  No antiplatelet / no anticoagulation medication)  Including Coumadin and Aspirin     Refrain from activity that increases chance of trauma to head or falls  Recommend you take fall precaution  No strenuous activity or sports  Please follow up with the Neurosurgical group in 1 week  2-3 days prior to your appointment complete CT head at any [facility]  To schedule your Ct scan call [phone]    Return to hospital Emergency Room if you experience worsening / new headache, nausea/vomiting, speech/vision change, seizure, confusion / mental status change, weakness, or other neurological changes

## 2018-04-25 NOTE — PHYSICAL THERAPY NOTE
PHYSICAL THERAPY EVALUATION        Patient Name: Nany Doctor  TVHRS'E Date: 4/25/2018         Patient Active Problem List   Diagnosis    Hypotension due to drugs    SDH (subdural hematoma) (HCC)    Intraparenchymal hematoma of brain (HCC)    Atrial fibrillation (HCC)    [facility]    Diabetes mellitus type 2, diet-controlled ([facility] )    CAD (coronary artery disease)    History of implantable cardioverter-defibrillator (ICD) placement    CHF (congestive heart failure) ([facility] )    Fall    MCI (mild cognitive impairment)    Ambulatory dysfunction    Malnutrition ([facility] )    Physical deconditioning       Past Medical History:   Diagnosis Date    Arthritis     Cardiac disease     [facility]     Coronary artery disease     [facility]     Hyperlipidemia     Hypertension     Stroke ([facility] )     7 months ago       Past Surgical History:   Procedure Laterality Date    CARDIAC DEFIBRILLATOR PLACEMENT      with multiple revisions    CARDIAC ELECTROPHYSIOLOGY STUDY AND ABLATION      4/2018    CHOLECYSTECTOMY      CORONARY ANGIOPLASTY WITH STENT PLACEMENT      age 77    EYE SURGERY Bilateral     cataract    JOINT REPLACEMENT Right     knee x 10    LUMBAR SPINE SURGERY      unknown       04/25/18 1042   Note Type   Note type Eval only   Pain Assessment   Pain Assessment 0-10   Pain Score 5   Pain Type Acute pain   Pain Location Buttocks; Head   Home Living   Type of [address] One level;Stairs to enter with rails  (2 KAI)   Home Equipment Cane   Additional Comments Pt states he typically does not use SPC   Prior Function   Level of [place] Independent with ADLs and functional mobility   Lives With Alone   ADL Assistance Independent   IADLs Independent   Falls in the last 6 months 1 to 4   Restrictions/Precautions   Weight Bearing Precautions Per Order No   Other Precautions Cognitive; Bed Alarm; Chair Alarm; Fall Risk;Pain   General Family/Caregiver Present No   Cognition   Overall Cognitive Status Impaired   Arousal/Participation Cooperative   Attention Attends with cues to redirect   Orientation Level Oriented X4   Memory Decreased recall of precautions;Decreased recall of recent events   Following Commands Follows one step commands with increased time or repetition   RUE Assessment   RUE Assessment WFL   LUE Assessment   LUE Assessment WFL   RLE Assessment   RLE Assessment X  (AROM WFL)   Strength RLE   RLE Overall Strength 4/5   LLE Assessment   LLE Assessment X  (AROM WFL)   Strength LLE   LLE Overall Strength 4/5   Light Touch   RLE Light Touch Grossly intact   LLE Light Touch Grossly intact   Bed Mobility   Supine to Sit 4  Minimal assistance   Additional items Assist x 1; Increased time required;Verbal cues;HOB elevated   Transfers   Sit to Stand 4  Minimal assistance   Additional items Assist x 1; Increased time required;Verbal cues   Stand to Sit 4  Minimal assistance   Additional items Assist x 1; Increased time required;Verbal cues   Ambulation/Elevation   Gait pattern Decreased foot clearance; Short stride; Excessively slow; Shuffling   Gait Assistance 4  Minimal assist   Additional items Assist x 1;Verbal cues; Tactile cues   Assistive Device Other (Comment)  (HHA x1)   Distance 80 ft   Stair Management Assistance Not tested   Balance   Static Sitting Fair +   Dynamic Sitting Fair -   Static Standing Fair -   Dynamic Standing Poor +   Ambulatory Poor +   Endurance Deficit   Endurance Deficit Yes   Activity Tolerance   Activity Tolerance Patient limited by fatigue;Patient limited by pain  (cog deficits)   Nurse Made Aware RN confirmed pt appropriate for PT eval   Assessment   Prognosis Good   Problem List Decreased strength;Decreased endurance; Impaired balance;Decreased mobility; Decreased coordination;Decreased cognition; Impaired judgement;Decreased safety awareness;Pain   Assessment Pt is 68 y o  male seen for PT evaluation s/p admit to [facility] on 4/24/2018 w/ bilateral frontal SDH (subdural hematoma) (HCC), L parietal SDH, mild parafalcine hemorrhage s/p fall  PT consulted to assess pt's functional mobility and d/c needs  Comorbidities affecting pt's physical performance at time of assessment include: arthritis, CHF, CAD, Dm, HLD, HTN, h/o CVA  PTA, pt was independent w/ all functional mobility w/ no AD  Personal factors affecting pt at time of IE include: stairs to enter home, inability to navigate community distances, inability to navigate level surfaces w/o external assistance, unable to perform dynamic tasks in community, decreased cognition, limited home support, positive fall history and inability to live alone  Please find objective findings from PT assessment regarding body systems outlined above with impairments and limitations including weakness, decreased ROM, impaired balance, decreased endurance, impaired coordination, gait deviations, pain, decreased activity tolerance, decreased functional mobility tolerance, decreased safety awareness, impaired judgement, fall risk and decreased cognition  The following objective measures performed on IE also reveal limitations: Barthel Index: 55/100  Pt's clinical presentation is currently unstable/unpredictable seen in pt's presentation of fall risk, decline from baseline, neuro checks, pain, abnormal labs  Pt currently demonstrates decreased safety awareness with all functional mobility  Pt ambulated with very narrow FRANCISCO and requires Josiah to assist with balance  Pt to benefit from continued PT tx to address deficits as defined above and maximize level of functional independent mobility and consistency  From PT/mobility standpoint, recommendation at time of d/c would be IP rehab pending progress in order to facilitate return to PLOF     Barriers to Discharge Inaccessible home environment;Decreased caregiver support   Barriers to Discharge Comments pt lives alone   Goals   Patient Goals none expressed   STG Expiration Date 05/05/18   Short Term Goal #1 In 10 days: Pt will perform sup<>sit mod I  Pt will perform sit<>stand and bed<>chair transfers mod I  Pt will amb 150+ ft with LRAD mod I  Pt will ascend/descend 2 stairs mod I  Treatment Day 0   Plan   Treatment/Interventions Functional transfer training;LE strengthening/ROM; Elevations; Therapeutic exercise; Endurance training;Cognitive reorientation;Patient/family training;Equipment eval/education; Bed mobility;Gait training   PT Frequency Other (Comment)  (6x/wk)   Recommendation   Recommendation Post acute IP rehab   Equipment Recommended Walker   PT - OK to Discharge Yes  (to rehab when medically cleared)   Barthel Index   Feeding 10   Bathing 0   Grooming Score 5   Dressing Score 5   Bladder Score 10   Bowels Score 10   Toilet Use Score 5   Transfers (Bed/Chair) Score 10   Mobility (Level Surface) Score 0   Stairs Score 0   Barthel Index Score 55         Yolanda Cruise, PT

## 2018-04-25 NOTE — PROGRESS NOTES
Progress Note - Neurosurgery   Relda Vero 68 y o  male MRN: [id number]  Unit/Bed#: [facility] 907-01 Encounter: [id number]    Assessment:  1  Bilateral frontal subdural hematoma  2  Left parietal subdural hematoma  3  Mild parafalcine hemorrhage  4  S/p mechanical fall  5  History of Afib on Coumadin  6  Hypertension  7  Hyperlipidemia   8  History of defibrillator    Plan:  · Exam: GCS 15  AAOx3, disoriented to month but oriented to year  GRAHAM wo focal weakness  No PD, kearney or clonus  Slight dysmetria bilaterally  · Imaging: personally reviewed and reviewed by attending:  · 4/24 - CT head wo: bilateral subdural hematoma, left parietal subdural hematoma and mild parafalcine hemorrhage  · 4/25 - CT head wo: blossoming of bilateral frontal contusions with surrounding edema  · 4/25 - Images discussed and reviewed with radiologist  · STAT CT head without contrast if decline in GCS >2 pts/1 hr  · INR on admit >9 - given Vitamin K, DDAVP and KCentra  · INR on repeat 1 47 --> 1 99 - continue to closely monitor  · SBP goal <160  · Keppra 500mg BID for seizure ppx x 1 week post-trauma (day 2/7)  · PT/OT: pending evaluation   · DVT ppx: SCDs, hold AC/AP at this time secondary to blossoming contusions  · Will need to hold Coumadin and ASA for at least 1-2 weeks  · Medical management deferred to primary team  · Pain control  · PT/OT - pending evaluation  · K+ 3 3   · Hgb 10 2 - transfuse if <8  · Recommend cardiology consult for history of cardiac ablation within 6 weeks  · No neurosurgical intervention  Due to stable examination, will repeat CT scan in 1 week  Signing off  Hold Coumadin and ASA during this time  Subjective/Objective   Chief Complaint: "Okay"    Subjective: patient states he's only doing okay  He admits to a left sided headache located over the incision, described as aching and rated 5-6 out of 10   He states when he was mobilizing earlier his headache worsened and he had associated dizziness which self resolved when he stopped  He also complains of left elbow pain  He denies chest pain, SOB, abdominal pain/N/V, weakness/numbness  Objective: sitting up in chair, NAD  I/O       04/23 0701 - 04/24 0700 04/24 0701 - 04/25 0700 04/25 0701 - 04/26 0700    P  O   360     I V  (mL/kg)  88 3 (1 2)     Total Intake(mL/kg)  448 3 (6 3)     Urine (mL/kg/hr)  275     Stool  0     Total Output   275      Net   +173 3             Unmeasured Urine Occurrence  1 x     Unmeasured Stool Occurrence  2 x           Invasive Devices     Peripheral Intravenous Line            Peripheral IV 04/24/18 Right Antecubital less than 1 day                Physical Exam:  Vitals: Blood pressure 113/72, pulse 93, temperature 98 2 °F (36 8 °C), temperature source Oral, resp  rate 18, height 6' 1" (1 854 m), weight 71 2 kg (156 lb 15 5 oz), SpO2 99 %  ,Body mass index is 20 71 kg/m²  General appearance: alert, appears stated age, cooperative and no distress  Head: Normocephalic, left occipital laceration closed with staples with surrounding ecchymosis   Eyes: EOMI, PERRL  Lungs: non labored breathing  Heart: regular heart rate  Neurologic:   Mental status: Alert, oriented x3, disoriented to month, thought content appropriate  Speech is fluent, clear  Able to repeat sentence, perform simple math, able to name days of the week in order backwards  Cranial nerves: grossly intact (Cranial nerves II-XII)  Facial symmetry at rest and with expression  Tongue is slightly deviated to the right  Sensory: normal to LT in bilateral upper and lower extremities  DSS intact  Motor: moving all extremities without focal weakness, strength 5/5 throughout  Reflexes: 2+ and symmetric  negative kearney, negative clonus  Coordination: finger to nose slight dysmetria bilaterally, no drift bilaterally   2/3 JPS in bilateral hallux intact      Lab Results:    Results from last 7 days  Lab Units 04/25/18  0441 04/24/18  1038 04/24/18  1032   WBC Thousand/uL 5 81 --  5 90   HEMOGLOBIN g/dL 10 2*  --  10 5*   I STAT HEMOGLOBIN g/dl  --  12 2  --    HEMATOCRIT % 32 8*  --  33 5*   PLATELETS Thousands/uL 205  --  250   NEUTROS PCT %  --   --  57   MONOS PCT %  --   --  9       Results from last 7 days  Lab Units 04/25/18  0441 04/24/18  1038 04/24/18  1032   SODIUM mmol/L 142  --  143   POTASSIUM mmol/L 3 3*  --  2 6*   CHLORIDE mmol/L 104  --  102   CO2 mmol/L 27  --  31   BUN mg/dL 17  --  15   CREATININE mg/dL 1 15  --  1 22   CALCIUM mg/dL 8 9  --  8 4   TOTAL PROTEIN g/dL 6 2*  --  6 7   BILIRUBIN TOTAL mg/dL 3 78*  --  2 50*   ALK PHOS U/L 101  --  109   ALT U/L <6*  --  9*   AST U/L 14  --  12   GLUCOSE RANDOM mg/dL 122  --  163*   GLUCOSE, ISTAT mg/dl  --  159*  --        Results from last 7 days  Lab Units 04/24/18  1324   MAGNESIUM mg/dL 2 0       Results from last 7 days  Lab Units 04/24/18  1324   PHOSPHORUS mg/dL 2 2*       Results from last 7 days  Lab Units 04/25/18  0441 04/24/18  1442 04/24/18  1324   INR  1 99* 1 47* 8 14*   PTT seconds  --   --  60*     No results found for: TROPONINT  ABG:No results found for: PHART, RAF6TXW, PO2ART, HPM5YPC, Q2ERUUQL, BEART, SOURCE    Imaging Studies: I have personally reviewed pertinent reports  and I have personally reviewed pertinent films in PACS    CT head wo contrast         XR elbow 2 vw left    (Results Pending)   CT head wo contrast    (Results Pending)     EKG, Pathology, and Other Studies: I have personally reviewed pertinent reports        VTE  Prophylaxis: Sequential compression device (Venodyne)

## 2018-04-25 NOTE — Clinical Note
Bifrontal SDH with left parietal SDH with blossoming bifrontal contusions  Repeat CT scan 1 week  History of Coumadin and ASA use

## 2018-04-25 NOTE — OCCUPATIONAL THERAPY NOTE
[address] Evaluation     Patient Name: Stefanie CARR Date: 4/25/2018  Problem List  Patient Active Problem List   Diagnosis    Hypotension due to drugs    SDH (subdural hematoma) (HCC)    Intraparenchymal hematoma of brain (HCC)    Atrial fibrillation (HCC)    [facility] disease [facility]    Diabetes mellitus type 2, diet-controlled ([facility]    CAD (coronary artery disease)    History of implantable cardioverter-defibrillator (ICD) placement    CHF (congestive heart failure) ([facility]    Fall    MCI (mild cognitive impairment)    Ambulatory dysfunction    Malnutrition ([facility]    Physical deconditioning     Past Medical History  Past Medical History:   Diagnosis Date    Arthritis     Cardiac disease     CHF (congestive heart failure) ([facility]     Coronary artery disease     Diabetes mellitus ([facility]     Hyperlipidemia     Hypertension     [facility]     7 months ago     Past Surgical History  Past Surgical History:   Procedure Laterality Date    CARDIAC DEFIBRILLATOR PLACEMENT      with multiple revisions    CARDIAC ELECTROPHYSIOLOGY STUDY AND ABLATION      4/2018    CHOLECYSTECTOMY      CORONARY ANGIOPLASTY WITH STENT PLACEMENT      age 77    EYE SURGERY Bilateral     cataract    JOINT REPLACEMENT Right     knee x 10    LUMBAR SPINE SURGERY      unknown          04/25/18 1043   Note Type   Note type Eval/Treat   Restrictions/Precautions   Weight Bearing Precautions Per Order No   Other Precautions Cognitive; Chair Alarm; Bed Alarm; Fall Risk;Pain   Pain Assessment   Pain Assessment 0-10   Pain Score 5   Pain Type Acute pain   Pain Location Buttocks; Head   Patient's Stated Pain Goal No pain   Hospital Pain Intervention(s) Repositioned; Ambulation/increased activity; Distraction; Emotional support   Response to Interventions tolerated    Home Living   Type of [address] One level;Stairs to enter with rails  (2 KAI )   Bathroom Shower/Tub Tub/shower unit   [employer] Standard   Bathroom Equipment Shower chair   Bathroom Accessibility Accessible   Home Equipment Cane   Additional Comments PT REPORTS KEEPING SPC IN CAR, HOWEVER NOT USING PTA  PT REPORTS OCCASIONAL USE OF SC PTA  CPAP FOR NIGHTIME USE    Prior Function   Level of Love Independent with ADLs and functional mobility   Lives With Alone   Receives Help From Neighbor   ADL Assistance Independent   IADLs Independent   Falls in the last 6 months (AT LEAST 4)   Vocational Retired   Lifestyle   Autonomy  [facility] ADLS/IADLS/DRIVING PTA  NEIGHBOR HELPS WITH TAKING OUT GARBAGE    Reciprocal Relationships LIMITED NEIGHBOR ASSISTANCE   Service to Others RETIRED   Intrinsic Gratification ENJOYS WATCHING TV   Psychosocial   Psychosocial (WDL) WDL   ADL   Eating Assistance 6  Modified independent   Grooming Assistance 5  Supervision/Setup   UB Bathing Assistance 4  Minimal Assistance   LB Bathing Assistance 4  Minimal Assistance   UB Dressing Assistance 4  Minimal Assistance   LB Dressing Assistance 4  Minimal Assistance   Toileting Assistance  4  Minimal Assistance   Functional Assistance 4  Minimal Assistance   Bed Mobility   Supine to Sit 4  Minimal assistance   Additional items Assist x 1; Increased time required;Verbal cues   Sit to Supine Unable to assess  (PT LEFT OOB WITH CHAIR ALARM ON )   Transfers   Sit to Stand 4  Minimal assistance   Additional items Assist x 1; Increased time required;Verbal cues   Stand to Sit 4  Minimal assistance   Additional items Assist x 1; Increased time required;Verbal cues   Functional Mobility   Functional Mobility 4  Minimal assistance   Additional Comments WITHOUT AD; LIMITED FUCNTIONAL MOBILITY, REQUESTING SEATED REST BREAK 2' FATIGUE/SOB  O2 STATS REMAINED IN 90S ON RA T/O ACTIVITY    Balance   Static Sitting Fair +   Static Standing Fair   Ambulatory Poor +   Activity Tolerance   Activity Tolerance Patient limited by fatigue;Patient limited by pain; Other (Comment)  (COG)   Medical Staff Made Aware SPOKE TO CM REGARDING D/C PLAN    Nurse Made Aware APPROPRIATE TO SEE PER RN    RUE Assessment   RUE Assessment WFL   LUE Assessment   LUE Assessment WFL   Hand Function   Gross Motor Coordination Functional   Fine Motor Coordination Functional   Sensation   Light Touch No apparent deficits   Cognition   Overall Cognitive Status Impaired   Arousal/Participation Alert; Cooperative   Attention Attends with cues to redirect   Orientation Level Oriented to person;Oriented to place;Oriented to situation;Disoriented to time  (ORIENTED TO NAME/, OFF FROM AGE BY 10 YEARS  )   Memory Decreased recall of precautions;Decreased recall of recent events   Following Commands Follows one step commands with increased time or repetition   Comments PT IS PLEASANT AND COOPERATIVE  SLOW TO PROCESS/RESPOND AT TIMES WITH CUES TO ATTEND TO TASK  PT REPORTS LIMITED RECALL OF RECENT EVENTS  RECOMMEND ALARM ON PT FOR SAFETY  ---PER GERIATRICS, PT SCORED 22/30 ON MOCA IMMEDIATELY PRIOR TO OT SESSION  Assessment   Limitation Decreased ADL status; Decreased Safe judgement during ADL;Decreased cognition;Decreased endurance;Decreased self-care trans;Decreased high-level ADLs   Prognosis Good   Assessment 76 YO MALE SEEN FOR INITIAL OT EVAL S/P FALL WITH +LOC  DX INCLUDES HEAD LACERATION AND B/L SDH  NO SX INTERVENTION AT THIS TIME  PT WITH PROBLEMS LIST INCLUDING DEPRESSION, A-FIB, CHF, DM, HTN, PREVIOUS CVA AND MULTIPLE FALLS  PT REPORTS BEING FROM HOME ALONE WHERE HE IS OVERALL INDEPENDENT WITH ADLS/IADLS/DRIVING  NEIGHBORS ASSIST WITH TAKING OUT THE GARBAGE  PT CURRENTLY REQURIES OVERALL MIN A FOR ADLS, TRANSFERS AND FUNCTIONAL MOBILITY WITHOUT USE OF AD  PER GERIATRICS, PT SCORED 22/30 ON MOCA, INDICATING A MILD COGNITIVE IMPAIRMENT- PT WOULD BENEFIT FROM COMPLETION ON ACLS PRIOR TO D/C HOME ALONE   PT IS LIMITED 2' PAIN, FATIGUE, IMPAIRED BALANCE, MULTIPLE FALLS, SLOW TO PROCESS/RESPOND, DISORIENTED TO AGE AND DATE, MILD COGNITIVE IMPAIRMENT ON MOCA, LIMITED AVAILABLE SUPPORT, SOB AND LIMITED ACTIVITY TOLERANCE  FROM AN OT PERSPECTIVE, PT WOULD BENEFIT FROM CONT OT SERVICES IN AN INPT REHAB SETTING UPON D/C  WILL CONT TO FOLLOW TO ADDRESS THE BELOW DESCRIBED GOALS  Goals   Patient Goals TO RETURN TO CHAIR TO REST    LTG Time Frame 10-14   Long Term Goal #1 SEE BELOW    Plan   Treatment Interventions ADL retraining;Functional transfer training; Endurance training;Cognitive reorientation;Patient/family training;Equipment evaluation/education; Compensatory technique education; Energy conservation; Activityengagement   Goal Expiration Date 05/09/18   OT Frequency 3-5x/wk   Recommendation   OT Discharge Recommendation Short Term Rehab   OT - OK to Discharge Yes  (TO INPT REHAB WHEN MEDICALLY CLEARED )   Barthel Index   Feeding 10   Bathing 0   Grooming Score 5   Dressing Score 5   Bladder Score 10   Bowels Score 10   Toilet Use Score 5   Transfers (Bed/Chair) Score 10   Mobility (Level Surface) Score 0   Stairs Score 0   Barthel Index Score 55   Modified [place] Scale   Modified [place] Scale 4       OCCUPATIONAL THERAPY GOALS TO BE MET WITHIN 10-14 DAYS:    -Pt will complete additional cognitive assessment (ACLS) with 100% attention to task in order to assist with safe d/c plan  -Pt will follow 100% simple 2-step commands and be A&O x4 consistently with environmental cues to increase participation in functional activities  -Pt will increase bed mobility to MOD I to participate in functional activities with G tolerance and balance  -Pt will improve functional mobility and transfers to MOD I on/off all surfaces w/ G balance/safety including toileting   -Pt will participate in lt grooming task with MOD I after set-up standing at sink ~3-5 minutes with G safety and balance     -Pt will increase independence in all ADLS to MOD I with G balance sitting upright in chair   -Pt will improve activity tolerance to G for 30 min txment sessions w/ G carry over of learned energy conservation techniques   -Pt will improve independence in lt homemaking activities to MOD I without requiring cues for safety   -Pt will demonstrate G carryover of learned safety techniques and proper body mechanics in functional and leisure activities with use of DME          Documentation completed by JASS Devries, OTR/L

## 2018-04-25 NOTE — PLAN OF CARE
Problem: PHYSICAL THERAPY ADULT  Goal: Performs mobility at highest level of function for planned discharge setting  See evaluation for individualized goals  Treatment/Interventions: Functional transfer training, LE strengthening/ROM, Elevations, Therapeutic exercise, Endurance training, Cognitive reorientation, Patient/family training, Equipment eval/education, Bed mobility, Gait training  Equipment Recommended: Cristina Mckenzie       See flowsheet documentation for full assessment, interventions and recommendations  Prognosis: Good  Problem List: Decreased strength, Decreased endurance, Impaired balance, Decreased mobility, Decreased coordination, Decreased cognition, Impaired judgement, Decreased safety awareness, Pain  Assessment: Pt is 68 y o  male seen for PT evaluation s/p admit to [facility] on 4/24/2018 w/ bilateral frontal SDH (subdural hematoma) (HCC), L parietal SDH, mild parafalcine hemorrhage s/p fall  PT consulted to assess pt's functional mobility and d/c needs  Comorbidities affecting pt's physical performance at time of assessment include: arthritis, CHF, CAD, Dm, HLD, HTN, h/o CVA  PTA, pt was independent w/ all functional mobility w/ no AD  Personal factors affecting pt at time of IE include: stairs to enter home, inability to navigate community distances, inability to navigate level surfaces w/o external assistance, unable to perform dynamic tasks in community, decreased cognition, limited home support, positive fall history and inability to live alone  Please find objective findings from PT assessment regarding body systems outlined above with impairments and limitations including weakness, decreased ROM, impaired balance, decreased endurance, impaired coordination, gait deviations, pain, decreased activity tolerance, decreased functional mobility tolerance, decreased safety awareness, impaired judgement, fall risk and decreased cognition   The following objective measures performed on IE also reveal limitations: Barthel Index: 55/100  Pt's clinical presentation is currently unstable/unpredictable seen in pt's presentation of fall risk, decline from baseline, neuro checks, pain, abnormal labs  Pt currently demonstrates decreased safety awareness with all functional mobility  Pt ambulated with very narrow FRANCISCO and requires Josiah to assist with balance  Pt to benefit from continued PT tx to address deficits as defined above and maximize level of functional independent mobility and consistency  From PT/mobility standpoint, recommendation at time of d/c would be IP rehab pending progress in order to facilitate return to PLOF  Barriers to Discharge: Inaccessible home environment, Decreased caregiver support  Barriers to Discharge Comments: pt lives alone  Recommendation: Post acute IP rehab     PT - OK to Discharge: Yes (to rehab when medically cleared)    See flowsheet documentation for full assessment

## 2018-04-25 NOTE — MEDICAL STUDENT
Progress Note - Neurosurgery   Reinaldo Holland 68 y o  male MRN: [id number]  [facility] Encounter: [id number]    Assessment:  1  Acute fall with LOC  2  Mild subdural hematoma  3  Head laceration  4  Atrial fibrillation  5  Cardiac pacemaker  6  Type 2 DM  7  HTN / Hyperlipidemia  8  Essential tremor    Plan:  67 YO male who sustained a fall x 1 day suffering L/R frontal lobe and left parietal lobe hematoma  · Most recent CTH  revealed new-onset bifrontal contusions  · GCS stable, neurologic exam increased today versus yesterday  Repeat CTH warranted in 1 week  · Check neurologic status q6 hours or as needed  · PT recommendation of rehab once medically cleared w/ walker  · Encouraged patient to continue with incentive spirometry q6 minutes to prevent atelectasis  · SCDs for DVT prophylaxis, hold pharmacologic DVT due to worsening CTH  · Keppra BID for 7 days total (6/7 remaining)  · SBP goal of < 160  · Medical management for DM 2, HTN, Hyperlipidemia, tremor  No neurosurgical intervention warranted at this time  Re-consult team if necessary  Subjective/Objective   Chief Complaint: "fine"    Subjective: Patient is a 67 YO male who was admitted yesterday after sustaining a fall with LOC and a subdural hemorrhage  He tells me he has had 2-3 other falls in the past  The patient tells me he has no complaints today  The patient denies new onset headaches, vision/hearing changes, dizziness, lightheadedness, vertigo, N/V, chest pain, abdominal pain  He tells me that he ate 50% of his lunch  PT came earlier in the day and helped walk him around the unit  He experienced some SOB with them  He also tells me that his friend Francisco Javier Magallanes and his wife came to visit earlier today  Objective:   Non-acute distress found lying in bed      Intake/Output       04/25/18 0701 - 04/26/18 0700      [id number] Total       Intake    P O   160  -- 160    Total Intake 160 -- 160       Output    Urine  --  -- -- Unmeasured Urine Occurrence 1 x -- 1 x    Stool  --  -- --    Unmeasured Stool Occurrence 1 x -- 1 x    Total Output -- -- --       Net I/O     160 -- 160          Invasive Devices     Peripheral Intravenous Line            Peripheral IV 04/24/18 Right Antecubital 1 day                Physical Exam: BP 96/58 (BP Location: Right arm)   Pulse 86   Temp 98 2 °F (36 8 °C) (Oral)   Resp 20   Ht 6' 1" (1 854 m)   Wt 71 2 kg (156 lb 15 5 oz)   SpO2 99%   BMI 20 71 kg/m²     General Appearance:    CAOx4 to person, place, time and events  Patient was cooperative, not in acute distress found in hospital bed  Head:    Normocephalic   Eyes:    PERRL, conjunctiva/corneas clear, EOM's intact  No visual field deficits  Visual acuity 20/40 with correction  Ears:    Gross hearing impaired  Neck:   Trachea midline  Back:     Non-tender to palpation   Lungs:     Clear to auscultation bilaterally, respirations unlabored  Incentive spirometry demonstrated at 2,000 mL  Heart:    Regular rate and rhythm, S1 and S2 normal, no M/R/G   Abdomen:     Soft, non-tender, non-distended  Extremities:   Extremities normal, atraumatic, no cyanosis or edema   Pulses:   2+ and symmetric all extremities   Skin:  Skin warm, pink dry, no rashes  Neurologic:  GCS 15  CNII-CNXII intact  Tongue midline  Patient able to touch finger-nose  Negative pronator drift  Patient able to do simple calculations  Gross sensation to touch intact  Normal muscle tone   strength, elbow flexion/extension, hip flexion/extension, knee flexion/tension, ankle flexion/extension 5+ bilaterally  Normal ROM in extremities  2+ ankle, patellar, brachioradiallis, brachial reflexes bilaterally  No clonus noted, bilaterally  Vitals: Blood pressure 96/58, pulse 86, temperature 98 2 °F (36 8 °C), temperature source Oral, resp  rate 20, height 6' 1" (1 854 m), weight 71 2 kg (156 lb 15 5 oz), SpO2 99 %  ,Body mass index is 20 71 kg/m²      Lab Results: I have personally reviewed pertinent results  Imaging Studies: I have personally reviewed pertinent films in PACS   XR elbow 2 vw left   Final Result      No acute osseous abnormality  Workstation performed: [id number]         CT head wo contrast   Final Result   1  Slight progression of the multicompartmental intracranial hemorrhages  Specifically, interval development of bifrontal hemorrhagic contusions and enlarging subdural hemorrhages  2   Cerebral atrophy with chronic small vessel ischemic change  I personally discussed this study with Kristin Bonilla on 4/25/2018 at 10:56 AM                Workstation performed: [id number]         CT head wo contrast    (Results Pending)       EKG, Pathology, and Other Studies: I have personally reviewed pertinent reports        VTE Pharmacologic Prophylaxis: Sequential compression device (Venodyne)     VTE Mechanical Prophylaxis: sequential compression device

## 2018-04-25 NOTE — CASE MANAGEMENT
Initial Clinical Review    Admission: Date/Time/Statement: 4/24/18 @ 1417     Orders Placed This Encounter   Procedures    Inpatient Admission     Standing Status:   Standing     Number of Occurrences:   1     Order Specific Question:   Admitting Physician     Answer:   Arely Willingham [[id number]]     Order Specific Question:   Level of Care     Answer:   Level 2 Stepdown / HOT [14]     Order Specific Question:   Bed Type     Answer:   Trauma [7]     Order Specific Question:   Estimated length of stay     Answer:   More than 2 Midnights     Order Specific Question:   Certification     Answer:   I certify that inpatient services are medically necessary for this patient for a duration of greater than two midnights  See H&P and MD Progress Notes for additional information about the patient's course of treatment  ED: Date/Time/Mode of Arrival: 4/24 Transfer from [facility] ED to Critical access hospital ED    ED Arrival Information     Expected [address] of Arrival Escorted By Service Admission Type    4/24/2018 12:11 4/24/2018 12:28 Immediate Ambulance [facility]) Trauma Emergency    Arrival Complaint    head bleed s/p fall          Chief Complaint:   Chief Complaint   Patient presents with    Fall     Patient is a trauma transfer from [facility]; Patient was getting CPAP fixed when he fell backwards and struck his head  +Thinners, +LOC  GS 15  -Head/neck/back pain  Patient was found to have a left sdied subdural and subarachnoid hemorrhage  Patient has 5 staples placed on posterior head     History of Illness: 68 y o  male who presents with fall from standing  Patient states he suffered a mechanical fall at approximately 9:00 a m  this morning  Patient states he slept on a curb and fell backwards striking the back of his head  Patient did lose consciousness  Patient denies headaches, vision changes, nausea vomiting, numbness or weakness    Patient does take Coumadin for history of atrial fibrillation and CVA  Additionally, patient also takes aspirin 81  Patient presented to [facility] see Department where he was GCS 15  He underwent CT scan of his head and C-spine that demonstrated a left frontal and parietal subdural hematoma  There is no evidence of midline shift at that point  Cervical spine showed no acute injuries  It was found, that the patient had a Coumadin coagulopathy, with INR of 9 07  He received 1 dose of vitamin K at [address] prior to transfer        ED Vital Signs:   ED Triage Vitals [04/24/18 1233]   Temperature Pulse Respirations Blood Pressure SpO2   98 4 °F (36 9 °C) 96 20 122/76 100 %      Temp Source Heart Rate Source Patient Position - Orthostatic VS BP Location FiO2 (%)   Oral Monitor Sitting Right arm --      Pain Score       4        Wt Readings from Last 1 Encounters:   04/24/18 71 2 kg (156 lb 15 5 oz)       Vital Signs (abnormal):   04/24/18 2310  --  102  --  --  --  --  --   04/24/18 2251  98 1 °F (36 7 °C)   112  18  92/54  97 %  Other (comment)  Lying   O2 Device: Cpap at 04/24/18 2251   04/24/18 2223  --  --  --  --  98 %  --  --   04/24/18 2001  97 8 °F (36 6 °C)   111  20  115/75  98 %  None (Room air)  Lying   04/24/18 1551  99 4 °F (37 4 °C)  104  22               Abnormal Labs:    04/24/18 0958    Protime 12 1 - 14 4 seconds 74 8     INR 0 86 - 1 16 9 07        143     Potassium 3 5 - 5 3 mmol/L 2 6       Albumin 3 5 - 5 0 g/dL 3 2     Total Bilirubin 0 20 - 1 00 mg/dL 2 50        04/24/18 1324    Phosphorus 2 3 - 4 1 mg/dL 2 2        04/24/18 1324    Bilirubin, Direct 0 00 - 0 20 mg/dL 0 89        04/24/18 1032    WBC 4 31 - 10 16 Thousand/uL 5 90    RBC 3 88 - 5 62 Million/uL 3 97    Hemoglobin 12 0 - 17 0 g/dL 10 5     Hematocrit 36 5 - 49 3 % 33 5       Diagnostic Test Results: CT Head - Mild left parietal scalp soft tissue swelling/contusion      6 x 5 mm mid parafalcine hemorrhage    This is superior to the 3rd ventricle      Minimal left anterolateral frontal subarachnoid hemorrhage      Left anterior frontal subdural hemorrhage maximum thickness 2 mm  Minimal left parietal subdural hemorrhage maximum thickness 3 to 4 mm  This is over the posterior convexity underlying the scalp contusion      No acute skull fracture      Chronic microangiopathy  ED Treatment:   Medication Administration from 04/24/2018 1211 to 04/24/2018 1537       Date/Time Order Dose Route Action     04/24/2018 1355 prothrombin complex conc human (KCENTRA) 3,322 Units 3,322 Units Intravenous Given     04/24/2018 1316 desmopressin (DDAVP) 21 2 mcg in sodium chloride 0 9 % 50 mL IVPB 21 2 mcg Intravenous New Bag          Past Medical/Surgical History:    Active Ambulatory Problems     Diagnosis Date Noted    Hypotension due to drugs 09/15/2017     Resolved Ambulatory Problems     Diagnosis Date Noted    No Resolved Ambulatory Problems     Past Medical History:   Diagnosis Date    Arthritis     Cardiac disease     [facility]     Coronary artery disease     Diabetes mellitus ([facility]     Hyperlipidemia     Hypertension     Stroke [facility])        Admitting Diagnosis: Subdural hemorrhage ([facility] ) [I62 00]  Coagulopathy ([facility] ) [D68 9]  Head injuries [S09 90XA]    Age/Sex: 68 y o  male    Assessment/Plan:   Trauma Alert: Evaluation  Model of Arrival: Ambulance     Trauma Active Problems:   Subdural hematoma  Coumadin coagulopathy  Fall from standing     Trauma Plan:   Admit to trauma HOT protocol  Emergent coumadin reversal w/ [address]  DDAVP for h/o ASA use  Q1H neuro exams  Repeat CT in AM  NPO  IVF  Hold A/C and DVT ppx      Admission Orders:  Tele monitoring  Neurological checks q1h  Cardio-Pulmonary monitoring  Neurosurgery cons  Gerontology cons  PT/OT eval and treat    Scheduled Meds:   Current Facility-Administered Medications:  carbidopa-levodopa 1 tablet Oral TID   carvedilol 6 25 mg Oral BID With Meals   escitalopram 15 mg Oral Daily   furosemide 40 mg Oral BID   levETIRAcetam 500 mg Oral Q12H JESSE   phytonadione 5 mg Oral Daily   spironolactone 25 mg Oral Daily     Continuous Infusions:   sodium chloride 50 mL/hr Last Rate: 50 mL/hr (04/24/18 1715)     PRN Meds:     Acetaminophen po x1    nitroglycerin    ondansetron    -----------------------------------------------------------------------------------    4/24 Neurosurgery cons:    Assessment:  1  Bilateral frontal subdural hematomas  2  Left parietal subdural hematoma   3  Mild parafalcine hemorrhage  4  S/p mechanical fall with unknown LOC  5  History of afib on Coumadin  6  Hypertension  7  Hyperlipidemia  8  History of defibrillator     Plan:  · Exam: GCS 14 (-1 confusion) AAOx2, disoriented to time  GRAHAM wo focal weakness  No PD, kearney or clonus  Finger to nose slight dysmetria  · Imaging: personally reviewed on 4/24/18:  ? 4/24 - CT head wo: bilateral frontal subdural hematoma, left parietal subdural hematoma, mild parafalcine hemorrhage  ? 4/24 - CT c-spine wo: no cervical fractures or malalignment   · Repeat CT head tomorrow or STAT if decline in GCS >2 pts/ 1hr  · INR on admit >9 - given Vitamin K at [facility]  ? KCentra and DDAVP given in addition on 4/24/18   ? Continue to trend INR  · SBP goal <160  · Keppra 500mg q12h for seizure ppx x 1 week post-trauma  · DVT ppx: SCDs, hold AC/AP secondary to subdural/subarachnoid hemorrhage  · Medical management per primary team  ? PT/OT - from nsx standpoint okay to mobilize as tolerated  Pending evaluation  ? Recommend cardiology consult secondary to history of cardiac ablation within 6 weeks  · No neurosurgical intervention at this time, will continue to close monitor

## 2018-04-25 NOTE — PLAN OF CARE
Problem: OCCUPATIONAL THERAPY ADULT  Goal: Performs self-care activities at highest level of function for planned discharge setting  See evaluation for individualized goals  Treatment Interventions: ADL retraining, Functional transfer training, Endurance training, Cognitive reorientation, Patient/family training, Equipment evaluation/education, Compensatory technique education, Energy conservation, Activityengagement          See flowsheet documentation for full assessment, interventions and recommendations  Limitation: Decreased ADL status, Decreased Safe judgement during ADL, Decreased cognition, Decreased endurance, Decreased self-care trans, Decreased high-level ADLs  Prognosis: Good  Assessment: 76 YO MALE SEEN FOR INITIAL OT EVAL S/P FALL WITH +LOC  DX INCLUDES HEAD LACERATION AND B/L SDH  NO SX INTERVENTION AT THIS TIME  PT WITH PROBLEMS LIST INCLUDING DEPRESSION, A-FIB, CHF, DM, HTN, PREVIOUS CVA AND MULTIPLE FALLS  PT REPORTS BEING FROM HOME ALONE WHERE HE IS OVERALL INDEPENDENT WITH ADLS/IADLS/DRIVING  NEIGHBORS ASSIST WITH TAKING OUT THE GARBAGE  PT CURRENTLY REQURIES OVERALL MIN A FOR ADLS, TRANSFERS AND FUNCTIONAL MOBILITY WITHOUT USE OF AD  PER GERIATRICS, PT SCORED 22/30 ON MOCA, INDICATING A MILD COGNITIVE IMPAIRMENT- PT WOULD BENEFIT FROM COMPLETION ON ACLS PRIOR TO D/C HOME ALONE  PT IS LIMITED 2' PAIN, FATIGUE, IMPAIRED BALANCE, MULTIPLE FALLS, SLOW TO PROCESS/RESPOND, DISORIENTED TO AGE AND DATE, MILD COGNITIVE IMPAIRMENT ON MOCA, LIMITED AVAILABLE SUPPORT, SOB AND LIMITED ACTIVITY TOLERANCE  FROM AN OT PERSPECTIVE, PT WOULD BENEFIT FROM CONT OT SERVICES IN AN INPT REHAB SETTING UPON D/C  WILL CONT TO FOLLOW TO ADDRESS THE BELOW DESCRIBED GOALS        OT Discharge Recommendation: Short Term Rehab  OT - OK to Discharge: Yes (TO INPT [address] )

## 2018-04-25 NOTE — SOCIAL WORK
CM met with pt to discuss d/c plan  Pt was recommended for SNF   Pt requested [facility] as his first choice with [facility], [facility], and [phone] as other options

## 2018-04-25 NOTE — PROGRESS NOTES
Evaluated patient after a fall  Patient reportedly got out of bed and fell, striking his head  He did not lose consciousness  He is complaining of a headache as well as back and neck pain  No numbness or weakness of extremities  Patient was placed in a cervical collar and placed back in bed with spinal precautions  Cervical and thoracic spinal tenderness on exam   Will obtain stat CT of his head, cervical, thoracic and lumbar spine    Also will obtain x-ray of right elbow given pain and contusion

## 2018-04-25 NOTE — TERTIARY TRAUMA SURVEY
Progress Note - Tertiary Trauma Survery   Jaime Park 68 y o  male MRN: [id number]  Unit/Bed#: [facility] 907-01 Encounter: [id number]    Summary of Diagnosed Injuries:   -mechanical fall  -traumatic SDH/IPH  -coumadin coagulopathy  -afib  -CHF sp ICD  -parkinsons  -hyperbilirubinemia  -scalp lac s/p staples  -L elbow ecchymosis    Clinical Plan:   -HOT protocol  F/u repeat ct head  Close neuro checks  Hold chem ac  nsg consult, appreciate input  -sp [address], vit k  INR corrected to 1 47 yesterday  Now 1 99 today  Will give PO vit k for 3 days     -rate controlled  HD stable, pt  Does not appear in any resp distress or fluid overloaded on exam    -continue sinemet  -pt/ot eval     -bili 3 78 today, up from 2 50 yesterday  Direct component 0 89  Non obstructive hyperbilirubinemia  Pt  Has hx cholecystectomy many years ago  Consider formal RUQ US to further eval   Will check cmp tomorrow as well     -pt  Will need to f/u for staple removal in 5-7 days  -f/u plain films L elbow    Mechanism of Injury: Fall    Transfer from: [facility]  Outside Films Received: yes  Tertiary Exam Due on: 4/25    Vitals: Blood pressure 113/72, pulse 93, temperature 98 2 °F (36 8 °C), temperature source Oral, resp  rate 18, height 6' 1" (1 854 m), weight 71 2 kg (156 lb 15 5 oz), SpO2 99 %  ,Body mass index is 20 71 kg/m²  CT / RADIOGRAPHS: ALL RESULTS MUST BE CONFIRMED BY FACULTY OR PRINTED REPORT    CT HEAD: 6x5 mm mid parafalcine hemorrhage superior to 3rd ventircle  Min L anterolateral frontal SAH  Minimal L parietal SDH     CT CHEST:    CT FACE:  CT ABDOMEN / PELVIS:   CT CERVICAL SPINE: wnl XR PELVIS:    CT THORACIC / LUMBAR SPINE:  CXR CHEST:    OTHER: OTHER:    OTHER:  OTHER:    OTHER: OTHER:    OTHER:  OTHER:    OTHER:  OTHER:      Consultants - List Service/ Faculty and Date:   -neurosurgery  -geriatrics    Active medications:           Current Facility-Administered Medications:     acetaminophen (TYLENOL) tablet 650 mg, 650 mg, Oral, Q6H PRN, 650 mg at 04/25/18 0317    carbidopa-levodopa (SINEMET)  mg per tablet 1 tablet, 1 tablet, Oral, TID, 1 tablet at 04/24/18 2122    carvedilol (COREG) tablet 6 25 mg, 6 25 mg, Oral, BID With Meals    escitalopram (LEXAPRO) tablet 15 mg, 15 mg, Oral, Daily    furosemide (LASIX) tablet 40 mg, 40 mg, Oral, BID    levETIRAcetam (KEPPRA) oral solution 500 mg, 500 mg, Oral, Q12H JESSE, 500 mg at 04/24/18 2121    nitroglycerin (NITROSTAT) SL tablet 0 4 mg, 0 4 mg, Sublingual, Q5 Min PRN    ondansetron (ZOFRAN) injection 4 mg, 4 mg, Intravenous, Q6H PRN    phytonadione (MEPHYTON) tablet 5 mg, 5 mg, Oral, Daily    sodium chloride 0 9 % infusion, 50 mL/hr, Intravenous, Continuous, 50 mL/hr at 04/24/18 1715    spironolactone (ALDACTONE) tablet 25 mg, 25 mg, Oral, Daily      Intake/Output Summary (Last 24 hours) at 04/25/18 1032  Last data filed at 04/25/18 0900   Gross per 24 hour   Intake           608 33 ml   Output              275 ml   Net           333 33 ml       Invasive Devices     Peripheral Intravenous Line            Peripheral IV 04/24/18 Right Antecubital less than 1 day                CAGE-AID Questionnaire:    Was the patient able to participate in the CAGE-AID screening questions on admission? Yes    Is the patient 65 years or older: YES:    1  Before the illness or injury that brought you to the Emergency, did you need someone to help you on a regular basis? 1=Yes   2  Since the illness or injury that brought you to the Emergency, have you needed more help than usual to take care of yourself? 1=Yes   3  Have you been hospitalized for one or more nights during the past 6 months (excluding a stay in the Emergency Department)? 1=Yes   4  In general, do you see well? 0=Yes   5  In general, do you have serious problems with your memory? 1=Yes   6  Do you take more than three different medications everyday?  1=Yes   TOTAL   5     Did you order a geriatric consult if the score was 2 or greater?: yes    1  GCS:  GCS Total:  15  2  Head:   WNL and occipital scalp lac s/p staples  c/d/i  surrundoing ecchymosis  no cerpitus  3  Neck:   WNL  4  Chest:   WNL and L scapular ecchymosis, mildly tender  5  Abdomen/Pelvis:   WNL  6  Back (log roll with spinal immobilization unless cleared radiographically): WNL  7  Extremities:   Lacs, abrasions, swelling, ecchymosis: L elbow ecchymosis, tenderness   Tenderness, pain with motor, instability:   8  Peripheral Nerves: WNL    Do NOT use the following abbreviations: DTO, gr, Idalmis, MS, MSO4, MgSO4, Nitro, QD, QID, QOD, u, , ?, ?g or trailing zeros   Always use a zero before a decimal     Labs:   CBC:   Lab Results   Component Value Date    WBC 5 81 04/25/2018    HGB 10 2 (L) 04/25/2018    HCT 32 8 (L) 04/25/2018    MCV 86 04/25/2018     04/25/2018    MCH 26 7 (L) 04/25/2018    MCHC 31 1 (L) 04/25/2018    RDW 22 2 (H) 04/25/2018    MPV 9 7 04/25/2018     CMP:   Lab Results   Component Value Date     04/25/2018     04/25/2018    CO2 27 04/25/2018    ANIONGAP 11 04/25/2018    BUN 17 04/25/2018    CREATININE 1 15 04/25/2018    GLUCOSE 122 04/25/2018    GLUCOSE 159 (H) 04/24/2018    CALCIUM 8 9 04/25/2018    AST 14 04/25/2018    ALT <6 (L) 04/25/2018    ALKPHOS 101 04/25/2018    PROT 6 2 (L) 04/25/2018    BILITOT 3 78 (H) 04/25/2018    EGFR 61 04/25/2018    EGFR 72 04/24/2018

## 2018-04-25 NOTE — CONSULTS
Consultation - Pedro Luis Barajas 68 y o  male MRN: [id number]  Unit/Bed#: [facility] 907-01 Encounter: [id number]      Assessment/Plan  1  Fall  Fall precautions  No suspicious home meds  Continue with vitamin-D supplementation  Patient has cane but was not using it when he fell  PT, OT recommending rehab    2  Parkinson's disease  Reports he was diagnosed about a year ago  [address] with neurologist  Continue with home carbidopa levodopa    3  Mild cognitive impairment  Likely related to 2   22/30 on [address]  Patient denies any problems with his memory, though on exam he does appear to have word-finding difficulties, slow thought process  Will draw a B12, folate, TSH should check reversible causes   Patient needs formal cognitive assessment, this can be done at [facility] upon discharge  Patient needs fit to drive assessment    4  Ambulatory dysfunction  Patient reports he does not use any DME at baseline  PT, OT  Patient may benefit from assistive device to improve stability, especially in light of 2  Continue with home vitamin D supplementation    5  Delirium precautions  Will schedule Tylenol 650 mg Q 8  Will add bowel regimen Senokot S daily  Refer to geriatric pain med order set for further pain med recommendations  Redirect unwanted patient behaviors as first line tx  Reorient patient frequently  Avoid deliriogenic meds including tramadol, benzodiazepines, benadryl  Good sleep hygiene important, limit night time interruptions  Encourage patient to stay awake during the day  Ensure adequate hydration/nutrition  Mobilize often     6  Malnutrition  Recommend patient add Ensure t i d  to diet, will add    7  Deconditioning  Chronic  Mobilize patient frequently to prevent further decline  PT, OT  Patient may benefit from rehab to improve gait stability and strength    8    Subdural hematoma  Neurosurgery following, anticipate no neuro surgical intervention          History of Present Illness   Physician Requesting Consult: Matilda Reyes MD  Reason for Consult / Principal Problem: isar  Hx and PE limited by: n/a  HPI: Jaime Park is a 68y o  year old male who presents to [facility] transfer from [address] [facility] after falling backwards and striking the back of his head  The patient was found to have subdural hematoma, Coumadin coagulopathy, patient was admitted under the trauma team   Patient was seen by Neurosurgery who recommended no intervention at this time  Prior to arrival patient lived alone  Did not use any DME at baseline  He reports he has had other falls  Patient's POA reports that patient has been more tired lately, also reports that patient has had intermittent confusion over the past couple of years  Patient is independent with ADLs and IADLs  He does still drive  He manages his own medications, reports weighing himself every morning to determine Lasix dosage  Patient reports Parkinson's disease diagnosis about a year ago  Patient has children, but is estranged from them  Patient denies any issues with his memory  Inpatient consult to Gerontology  Consult performed by: Suraj Mays  Consult ordered by: Elisabeth Niño          Review of Systems   Respiratory: Negative for chest tightness and shortness of breath  Cardiovascular: Negative for chest pain and palpitations  Gastrointestinal: Negative for abdominal distention, abdominal pain, constipation, diarrhea, nausea and vomiting  Musculoskeletal: Positive for myalgias  Psychiatric/Behavioral: Positive for dysphoric mood  Negative for confusion  The patient is nervous/anxious  All other systems reviewed and are negative        Historical Information   Past Medical History:   Diagnosis Date    Arthritis     Cardiac disease     [facility]     Coronary artery disease     Diabetes mellitus ([facility] )     Hyperlipidemia     Hypertension     Stroke ([facility] )     7 months ago     Past Surgical History:   Procedure Laterality Date   [address]      with multiple revisions    CARDIAC ELECTROPHYSIOLOGY STUDY AND ABLATION      4/2018    CHOLECYSTECTOMY      CORONARY ANGIOPLASTY WITH STENT PLACEMENT      age 77    EYE SURGERY Bilateral     cataract    JOINT REPLACEMENT Right     knee x 10    LUMBAR SPINE SURGERY      unknown      Social History   History   Alcohol Use    Yes     Comment: social     History   Drug Use No     History   Smoking Status    Former Smoker   Smokeless Tobacco    Never Used         Family History: non-contributory    Meds/Allergies   Current meds:   Current Facility-Administered Medications   Medication Dose Route Frequency    acetaminophen (TYLENOL) tablet 650 mg  650 mg Oral Q6H PRN    carbidopa-levodopa (SINEMET)  mg per tablet 1 tablet  1 tablet Oral TID    carvedilol (COREG) tablet 6 25 mg  6 25 mg Oral BID With Meals    escitalopram (LEXAPRO) tablet 15 mg  15 mg Oral Daily    furosemide (LASIX) tablet 40 mg  40 mg Oral BID    levETIRAcetam (KEPPRA) oral solution 500 mg  500 mg Oral Q12H [address]    nitroglycerin (NITROSTAT) SL tablet 0 4 mg  0 4 mg Sublingual Q5 Min PRN    ondansetron (ZOFRAN) injection 4 mg  4 mg Intravenous Q6H PRN    sodium chloride 0 9 % infusion  50 mL/hr Intravenous Continuous    spironolactone (ALDACTONE) tablet 25 mg  25 mg Oral Daily      Current PTA meds:  Prescriptions Prior to Admission   Medication    aspirin (ECOTRIN LOW STRENGTH) 81 mg EC tablet    calcium carbonate-vitamin D (OSCAL-D) 500 mg-200 units per tablet    carbidopa-levodopa (SINEMET)  mg per tablet    carvedilol (COREG) 6 25 mg tablet    escitalopram (LEXAPRO) 10 mg tablet    ezetimibe-simvastatin (VYTORIN) 10-20 mg per tablet    furosemide (LASIX) 40 mg tablet    lisinopril (ZESTRIL) 2 5 mg tablet    nitroglycerin (NITROSTAT) 0 4 mg SL tablet    spironolactone (ALDACTONE) 25 mg tablet    warfarin (COUMADIN) 2 5 mg tablet    warfarin (COUMADIN) 5 mg tablet        Allergies   Allergen Reactions    Valium [Diazepam] Other (See Comments)     anger       Objective   Vitals: Blood pressure 113/72, pulse 93, temperature 98 2 °F (36 8 °C), temperature source Oral, resp  rate 18, height 6' 1" (1 854 m), weight 71 2 kg (156 lb 15 5 oz), SpO2 99 %  ,Body mass index is 20 71 kg/m²  Physical Exam   Constitutional: He is oriented to person, place, and time  He appears well-developed  No distress  thin   HENT:   Head: Normocephalic and atraumatic  Mouth/Throat: No oropharyngeal exudate  Eyes: Conjunctivae and EOM are normal  No scleral icterus  Neck: Neck supple  Cardiovascular: Normal rate  Pulmonary/Chest: Effort normal and breath sounds normal  He has no wheezes  He has no rales  Abdominal: Soft  Bowel sounds are normal  He exhibits no distension  Musculoskeletal: He exhibits no edema  Neurological: He is alert and oriented to person, place, and time  Skin: Skin is warm and dry  Psychiatric: He has a normal mood and affect  His behavior is normal  Judgment and thought content normal  He is not agitated and not actively hallucinating  Cognition and memory are not impaired  He exhibits normal recent memory and normal remote memory  22/30 on MOCA indicating mild cognitive impairment  Patient reports managing his own medications, weighing himself each day to determine lasix dose  Family friend/POA reports intermittent confusion  No signs of delirium at this time  Has mild depression, follows with pcp for it   Nursing note and vitals reviewed        Lab Results:   Results from last 7 days  Lab Units 04/25/18  0441   WBC Thousand/uL 5 81   HEMOGLOBIN g/dL 10 2*   HEMATOCRIT % 32 8*   PLATELETS Thousands/uL 205        Results from last 7 days  Lab Units 04/25/18  0441   SODIUM mmol/L 142   POTASSIUM mmol/L 3 3*   CHLORIDE mmol/L 104   CO2 mmol/L 27   BUN mg/dL 17   CREATININE mg/dL 1 15   CALCIUM mg/dL 8 9   TOTAL PROTEIN g/dL 6 2*   BILIRUBIN TOTAL mg/dL 3 78*   ALK PHOS U/L 101   ALT U/L <6*   AST U/L 14   GLUCOSE RANDOM mg/dL 122       Imaging Studies: I have personally reviewed pertinent reports  EKG, Pathology, and Other Studies: I have personally reviewed pertinent reports  VTE Prophylaxis: Sequential compression device Alisa Gaona)     Code Status: Level 3 - DNAR and DNI      Counseling/Coordination of Care: Total floor / unit time spent today 25 minutes  Greater than 50% of total time was spent with the patient and / or family counseling and / or coordination of care   A description of the counseling / coordination of care: Assessing examine the patient, reviewing EMR meds, speaking to nursing staff, speaking to attending, speaking to trauma team

## 2018-04-26 PROBLEM — E80.6 HYPERBILIRUBINEMIA: Status: ACTIVE | Noted: 2018-01-01

## 2018-04-26 NOTE — ASSESSMENT & PLAN NOTE
4/24 CT head wo: bilateral subdural hematoma, left parietal subdural hematoma and mild parafalcine hemorrhage        4/25 CT head wo: blossoming of bilateral frontal contusions with surrounding edema        4/25 CT head wo: stable   - continue close neuro checks  - NS eval, no intervention needed   - INR 1 93, cont oral vit K   - hold AC for now   -seizure ppx

## 2018-04-26 NOTE — ASSESSMENT & PLAN NOTE
- Bilirubin decreased from yesterday, will continue to trend  - h/o cholecystectomy  - consider RUQ US

## 2018-04-26 NOTE — SOCIAL WORK
Pt's transport cancelled due to an issue at [facility]   They can accept tomorrow morning

## 2018-04-26 NOTE — CASE MANAGEMENT
Pending   Authorization #: [id number]      Jerry León RN Registered Nurse Signed   Case Management Date of Service: 4/25/2018  9:58 AM         []Hide copied text  Initial Clinical Review     Admission: Date/Time/Statement: 4/24/18 @ 1417      Orders Placed This Encounter   Procedures    Inpatient Admission       Standing Status:   Standing       Number of Occurrences:   1       Order Specific Question:   Admitting Physician       Answer:   Heather Melgoza [[id number]]       Order Specific Question:   Level of Care       Answer:   Level 2 Stepdown / HOT [14]       Order Specific Question:   Bed Type       Answer:   Trauma [7]       Order Specific Question:   Estimated length of stay       Answer:   More than 2 Midnights       Order Specific Question:   Certification       Answer:   I certify that inpatient services are medically necessary for this patient for a duration of greater than two midnights  See H&P and MD Progress Notes for additional information about the patient's course of treatment          ED: Date/Time/Mode of Arrival: 4/24 Transfer from [facility] ED to [facility]               ED Arrival Information      Expected Arrival [address] of Arrival Escorted By Service Admission Type     4/24/2018 12:11 4/24/2018 12:28 [facility]mediate [facility]) Trauma Emergency     Arrival Complaint     head bleed s/p fall             Chief Complaint:        Chief Complaint   Patient presents with    Fall       Patient is a trauma transfer from [facility]; Patient was getting CPAP fixed when he fell backwards and struck his head  +Thinners, +LOC  GS 15  -Head/neck/back pain  Patient was found to have a left sdied subdural and subarachnoid hemorrhage  Patient has 5 staples placed on posterior head      History of Illness: 68 y  o  male who presents with fall from standing   Patient states he suffered a mechanical fall at approximately 9:00 a m  this morning   Patient states he slept on a curb and fell backwards striking the back of his head   Patient did lose consciousness   Patient denies headaches, vision changes, nausea vomiting, numbness or weakness   Patient does take Coumadin for history of atrial fibrillation and CVA   Additionally, patient also takes aspirin 81   Patient presented to [facility] Internal Medicine see Department where he was GCS 15   He underwent CT scan of his head and C-spine that demonstrated a left frontal and parietal subdural hematoma  Keshav Kins is no evidence of midline shift at that point   Cervical spine showed no acute injuries   It was found, that the patient had a Coumadin coagulopathy, with INR of 9 07   He received 1 dose of vitamin K [facility] prior to transfer         ED Vital Signs:            ED Triage Vitals [04/24/18 1233]   Temperature Pulse Respirations Blood Pressure SpO2   98 4 °F (36 9 °C) 96 20 122/76 100 %       Temp Source Heart Rate Source Patient Position - Orthostatic VS BP Location FiO2 (%)   Oral Monitor Sitting Right arm --       Pain Score           4                Wt Readings from Last 1 Encounters:   04/24/18 71 2 kg (156 lb 15 5 oz)         Vital Signs (abnormal):   04/24/18 2310   --   102   --   --   --   --   --   04/24/18 2251   98 1 °F (36 7 °C)    112   18   92/54   97 %   Other (comment)   Lying   O2 Device: Cpap at 04/24/18 2251   04/24/18 2223   --   --   --   --   98 %   --   --   04/24/18 2001   97 8 °F (36 6 °C)    111   20   115/75   98 %   None (Room air)   Lying   04/24/18 1551   99 4 °F (37 4 °C)   104   22                         Abnormal Labs:     04/24/18 0958     Protime 12 1 - 14 4 seconds 74 8     INR 0 86 - 1 16 9 07          143      Potassium 3 5 - 5 3 mmol/L 2 6        Albumin 3 5 - 5 0 g/dL 3 2     Total Bilirubin 0 20 - 1 00 mg/dL 2 50          04/24/18 1324     Phosphorus 2 3 - 4 1 mg/dL 2 2          04/24/18 1324     Bilirubin, Direct 0 00 - 0 20 mg/dL 0 89          04/24/18 1032     WBC 4 31 - 10 16 Thousand/uL 5 90    RBC 3 88 - 5 62 Million/uL 3 97    Hemoglobin 12 0 - 17 0 g/dL 10 5     Hematocrit 36 5 - 49 3 % 33 5        Diagnostic Test Results: CT Head - Mild left parietal scalp soft tissue swelling/contusion      6 x 5 mm mid parafalcine hemorrhage   This is superior to the 3rd ventricle      Minimal left anterolateral frontal subarachnoid hemorrhage      Left anterior frontal subdural hemorrhage maximum thickness 2 mm  Minimal left parietal subdural hemorrhage maximum thickness 3 to 4 mm   This is over the posterior convexity underlying the scalp contusion      No acute skull fracture      Chronic microangiopathy      ED Treatment:             Medication Administration from 04/24/2018 1211 to 04/24/2018 1537        Date/Time Order Dose Route Action       04/24/2018 1355 prothrombin complex conc human (KCENTRA) 3,322 Units 3,322 Units Intravenous Given       04/24/2018 1316 desmopressin (DDAVP) 21 2 mcg in sodium chloride 0 9 % 50 mL IVPB 21 2 mcg Intravenous New Bag             Past Medical/Surgical History:         Active Ambulatory Problems     Diagnosis Date Noted    Hypotension due to drugs 09/15/2017           Resolved Ambulatory Problems     Diagnosis Date Noted    No Resolved Ambulatory Problems           Past Medical History:   Diagnosis Date    Arthritis      Cardiac disease      CHF (congestive heart failure) (HCC)      Coronary artery disease      Diabetes mellitus (HCC)      Hyperlipidemia      Hypertension      Stroke [facility])           Admitting Diagnosis: Subdural hemorrhage ([facility] ) [I62 00]  Coagulopathy ([address] ) [D68 9]  Head injuries [S09 90XA]     Age/Sex: 68 y o  male     Assessment/Plan:   Trauma Alert: Evaluation  Model of Arrival: Ambulance     Trauma Active Problems:   Subdural hematoma  Coumadin coagulopathy  Fall from standing     Trauma Plan:   Admit to trauma HOT protocol  Emergent coumadin reversal w/ [address]  DDAVP for h/o ASA use  Q1H neuro exams  Repeat CT in AM  NPO  IVF  Hold A/C and DVT ppx        Admission Orders:  Tele monitoring  Neurological checks q1h  Cardio-Pulmonary monitoring  Neurosurgery cons  Gerontology cons  PT/OT eval and treat     Scheduled Meds:   Current Facility-Administered Medications:  carbidopa-levodopa 1 tablet Oral TID   carvedilol 6 25 mg Oral BID With Meals   escitalopram 15 mg Oral Daily   furosemide 40 mg Oral BID   levETIRAcetam 500 mg Oral Q12H JESSE   phytonadione 5 mg Oral Daily   spironolactone 25 mg Oral Daily      Continuous Infusions:   sodium chloride 50 mL/hr Last Rate: 50 mL/hr (04/24/18 1715)      PRN Meds:     Acetaminophen po x1    nitroglycerin    ondansetron     -----------------------------------------------------------------------------------     4/24 Neurosurgery cons:    Assessment:  1  Bilateral frontal subdural hematomas  2  Left parietal subdural hematoma   3  Mild parafalcine hemorrhage  4  S/p mechanical fall with unknown LOC  5  History of afib on Coumadin  6  Hypertension  7  Hyperlipidemia  8  History of defibrillator     Plan:  · Exam: GCS 14 (-1 confusion) AAOx2, disoriented to time  GRAHAM wo focal weakness  No PD, kearney or clonus  Finger to nose slight dysmetria  · Imaging: personally reviewed on 4/24/18:  ? 4/24 - CT head wo: bilateral frontal subdural hematoma, left parietal subdural hematoma, mild parafalcine hemorrhage  ? 4/24 - CT c-spine wo: no cervical fractures or malalignment   · Repeat CT head tomorrow or STAT if decline in GCS >2 pts/ 1hr  · INR on admit >9 - given Vitamin K at [facility]  ? KCentra and DDAVP given in addition on 4/24/18   ? Continue to trend INR  · SBP goal <160  · Keppra 500mg q12h for seizure ppx x 1 week post-trauma  · DVT ppx: SCDs, hold AC/AP secondary to subdural/subarachnoid hemorrhage  · Medical management per primary team  ? PT/OT - from nsx standpoint okay to mobilize as tolerated  Pending evaluation  ?  Recommend cardiology consult secondary to history of cardiac ablation within 6 weeks  · No neurosurgical intervention at this time, will continue to close monitor  Thank you,  [address] [facility] in the [facility] by Titi Butterfield for 2017  Network Utilization Review Department  Phone: [phone]; Fax [phone]  ATTENTION: The Network Utilization Review Department is now centralized for our 7 Facilities  Make a note that we have a new phone and fax numbers for our Department  Please call with any questions or concerns to [phone] and carefully follow the prompts so that you are directed to the right person  All voicemails are confidential  Fax any determinations, approvals, denials, and requests for initial or continue stay review clinical to [phone]  Due to HIGH CALL volume, it would be easier if you could please send faxed requests to expedite your requests and in part, help us provide discharge notifications faster

## 2018-04-26 NOTE — SOCIAL WORK
CM obtained Auth  #[id number]  [id number]  Subacute Level   5/2/18 next review  [phone] phone  [phone] fax    Pt will d/c to [facility] today @7804 via [facility]

## 2018-04-26 NOTE — PROGRESS NOTES
Progress Note - Rhea Villalta 1940, 68 y o  male MRN: [id number]    Unit/Bed#: [facility] 907-01 Encounter: [id number]    Primary Care Provider: Nivia Bernardo   Date and time admitted to hospital: 4/24/2018 12:28 PM        Hyperbilirubinemia   Assessment & Plan    - Bilirubin decreased from yesterday, will continue to trend  - h/o cholecystectomy  - consider [address]    -fall yesterday evening  -repeat CT head, c/t/l spine stable         Parkinson disease ([facility] )   Assessment & Plan    -continue home medications         Atrial fibrillation ([facility] [facility] )   Assessment & Plan    - on coumadin  - rate currently controlled  - hold AC for now         * SDH (subdural hematoma) ([facility] [facility] )   Assessment & Plan    4/24 CT head wo: bilateral subdural hematoma, left parietal subdural hematoma and mild parafalcine hemorrhage        4/25 CT head wo: blossoming of bilateral frontal contusions with surrounding edema        4/25 CT head wo: stable   - continue close neuro checks  - NS eval, no intervention needed   - INR 1 93, cont oral vit K   - hold AC for now   -seizure ppx                   Subjective/Objective   Chief Complaint: no complaints this morning  Denies headache or back pain    Subjective:  Patient fell while getting out of bed yesterday evening  He had a stat CT scan of his head, cervical, thoracic and lumbar spine which were stable       Objective:     Meds/Allergies   Prescriptions Prior to Admission   Medication Sig Dispense Refill Last Dose    aspirin (ECOTRIN LOW STRENGTH) 81 mg EC tablet Take 81 mg by mouth daily   4/24/2018 at Unknown time    calcium carbonate-vitamin D (OSCAL-D) 500 mg-200 units per tablet Take 1 tablet by mouth 2 (two) times a day with meals   4/24/2018 at Unknown time    carbidopa-levodopa (SINEMET)  mg per tablet Take 1 tablet by mouth 3 (three) times a day   4/24/2018 at Unknown time    carvedilol (COREG) 6 25 mg tablet Take 6 25 mg by mouth 2 (two) times a day with meals   4/24/2018 at Unknown time    escitalopram (LEXAPRO) 10 mg tablet Take 15 mg by mouth daily   4/24/2018 at Unknown time    ezetimibe-simvastatin (VYTORIN) 10-20 mg per tablet Take 1 tablet by mouth daily at bedtime   4/24/2018 at Unknown time    furosemide (LASIX) 40 mg tablet Take 40 mg by mouth 2 (two) times a day Pt only takes when he gains more than two pounds a day   4/24/2018 at Unknown time    lisinopril (ZESTRIL) 2 5 mg tablet Take 2 5 mg by mouth daily   4/24/2018 at Unknown time    nitroglycerin (NITROSTAT) 0 4 mg SL tablet Place 0 4 mg under the tongue every 5 (five) minutes as needed for chest pain   4/24/2018 at Unknown time    spironolactone (ALDACTONE) 25 mg tablet Take 25 mg by mouth daily   4/24/2018 at Unknown time    warfarin (COUMADIN) 2 5 mg tablet Take 1 25 mg by mouth daily     4/24/2018 at Unknown time    warfarin (COUMADIN) 5 mg tablet Take 2 5 mg by mouth daily 2 5mg every day but friday Friday he takes 5mg     4/24/2018 at Unknown time       Vitals: Blood pressure 113/71, pulse 92, temperature 98 1 °F (36 7 °C), temperature source Oral, resp  rate 18, height 6' 1" (1 854 m), weight 71 2 kg (156 lb 15 5 oz), SpO2 95 %  Body mass index is 20 71 kg/m²       ABG: No results found for: PHART, PIN3VOT, PO2ART, IPA9CNB, Y4UJJPVI, BEART, SOURCE      Intake/Output Summary (Last 24 hours) at 04/26/18 0756  Last data filed at 04/26/18 9283   Gross per 24 hour   Intake              220 ml   Output                0 ml   Net              220 ml       Invasive Devices     Peripheral Intravenous Line            Peripheral IV 04/24/18 Right Antecubital 1 day                Nutrition/GI Proph/Bowel Reg:  Controlled carbs level 2    Physical Exam:   GENERAL APPEARANCE:  Awake, alert, no acute distress  HEENT:  Normocephalic, pupils equally round and reactive to light  CV:  Irregularly irregular  LUNGS:  Clear to auscultation bilateral  ABD:  Soft, nontender  EXT:  No peripheral edema, abrasions to bilateral elbows  NEURO:  GCS 15, no focal deficits  SKIN:  No rashes or breakdown    Lab Results:   Results: I have personally reviewed pertinent reports  and I have personally reviewed pertinent films in PACS, BMP/CMP:   Lab Results   Component Value Date     04/26/2018    K 3 5 04/26/2018     04/26/2018    CO2 25 04/26/2018    ANIONGAP 9 04/26/2018    BUN 24 04/26/2018    CREATININE 1 40 (H) 04/26/2018    GLUCOSE 117 04/26/2018    CALCIUM 8 5 04/26/2018    AST 24 04/26/2018    ALT <6 (L) 04/26/2018    ALKPHOS 83 04/26/2018    PROT 5 6 (L) 04/26/2018    BILITOT 3 52 (H) 04/26/2018    EGFR 48 04/26/2018   , CBC:   Lab Results   Component Value Date    WBC 7 02 04/26/2018    HGB 9 6 (L) 04/26/2018    HCT 30 5 (L) 04/26/2018    MCV 86 04/26/2018     04/26/2018    MCH 27 1 04/26/2018    MCHC 31 5 04/26/2018    RDW 22 3 (H) 04/26/2018    MPV 10 1 04/26/2018    NRBC 0 04/26/2018    and Coagulation:   Lab Results   Component Value Date    INR 1 93 (H) 04/26/2018     Imaging/EKG Studies: Results: I have personally reviewed pertinent reports     and I have personally reviewed pertinent films in PACS     VTE Prophylaxis: SCDs

## 2018-04-26 NOTE — PROGRESS NOTES
05/01/2018-SHAWNA FROM [facility] CALLED AND CONFIRMED TRANSPORT IS GOOD FOR CT SCHEDULED  [address] 05/02/2018 AT 7:00pm (arrival time 6:45pm)  LUDA WAS TOLD TO HAVE PT/TRANSPORT GET COPY OF CT DISC BEFORE LEAVING TO FACILITY AND TO BRING IT TO 5/03/2018 APPT  Yaneth Ramirez     05/01/2018-PER ED EKTA, Peer to Peer completed with Nurse Vanessa Eason  CT Head Approved      [facility]  Number: [id number]      Expires: 5/1/18 thru 6/29/18 05/01/2018-PER Jerrell Lennox @ [facility] (PHONE#[phone]), PT IS CAPITATED TO [facility] ([facility] IS OUT OF NETWORK AND PT DOES NOT HAVE OUT OF NETWORK BENEFITS  PRECERT IN PEER TO PEER, CLINICALS GIVEN TO ED-PA  [facility] HAS AN OPENING FOR TOMORROW (05/02/2018) AT 3:00pm OR 7:00pm, BUT WILL NOT SCHEDULE WITHOUT AN AUTH  CALLED [facility] SPOKE TO LUDA AND ADVISED       05/01/2018-SHAWNA CALLED FROM [facility] STATING PT IS SCHEDULED ON 05/03/2018 AT [facility]     04/30/2018-SHAWNA FROM [facility] CALLED AND R/S APPT TO 05/03/2018 AT 2:30pm, SHE WILL KEEP OFFICE UPDATED WITH SCHEDULING ON CT     04/30/2018 (2:02 pm)-CALLED [facility] AGAIN, SPOKE TO KYLER, WHO CONFIRMED THEY RECEIVED SCRIPT AND NPP, BUT ARE WAITING FOR PT'S FAMILYS RESPONSE IN REGARDS TO SCHEDULING CT  KYLER WAS ADVISED TO CALL ME BACK AT OFFICE ONCE THEY KNOW WHEN/WHERE CT IS SCHEDULED     04/30/2018-WAITING TO SEE WHEN CT IS SCHEDULED TO CHECK PRE-AUTH FOR [employer]URANCE    04/30/2018-CALLED [facility] TRIVEDI AT PHONE#[phone], SPOKE TO KYLER AND CONFIRMED 05/02/2018 APPT W/CT HEAD  KYLER WAS TOLD IF PT HAS CT HEAD AT ANOTHER FACILITY THEN TO BRING DISC TO APPT    FAXED NPP AND CT SCRIPT TO ATTENTION:SHAWNA AT FAX#[phone]     04/26/2018-PT Reginald 25, 05/02/2018 APPT W/CT HEAD

## 2018-04-27 PROBLEM — R79.89 ELEVATED SERUM CREATININE: Status: ACTIVE | Noted: 2018-01-01

## 2018-04-27 NOTE — ASSESSMENT & PLAN NOTE
- Slightly elevated from baseline at 1 40, 1 41  - Patient states he is eating and drinking as normal, normal urination  - Should f/u with PCP with repeat BMP

## 2018-04-27 NOTE — SOCIAL WORK
Pt will d/c to [facility] today @5830 via [facility] wheelchair

## 2018-04-27 NOTE — ASSESSMENT & PLAN NOTE
- Home coumadin held  - Vtach last night, 20 beats, self-terminated  No further issues overnight    - Electrolytes WNL this AM

## 2018-04-27 NOTE — DISCHARGE SUMMARY
Discharge Summary - Riky Lobato 68 y o  male MRN: [id number]    [facility] Encounter: [id number]    Admission Date:   Admission Orders     Ordered        04/24/18 1419  Inpatient Admission  Once               Admitting Diagnosis: Subdural hemorrhage ([facility] [address] ) [I62 00]  Coagulopathy ([facility] [address] ) [D68 9]  Head injuries [S09 90XA]    HPI: per resident:  Riky Lobato is a 68 y o  male who presents with fall from standing  Patient states he suffered a mechanical fall at approximately 9:00 a m  this morning  Patient states he slept on a curb and fell backwards striking the back of his head  Patient did lose consciousness  Patient denies headaches, vision changes, nausea vomiting, numbness or weakness  Patient does take Coumadin for history of atrial fibrillation and CVA  Additionally, patient also takes aspirin 81  Patient presented to [facility] Internal Medicine see Department where he was GCS 15  He underwent CT scan of his head and C-spine that demonstrated a left frontal and parietal subdural hematoma  There is no evidence of midline shift at that point  Cervical spine showed no acute injuries  It was found, that the patient had a Coumadin coagulopathy, with INR of 9 07  He received 1 dose of vitamin K at [facility] prior to transfer        Procedures Performed: No orders of the defined types were placed in this encounter  Summary of Hospital Course: 67 y/o male admitted after a fall from standing  No LOC documented, no complaints of headaches or visual changes  CT scan at [facility] demonstrated Parietal SDH and patient was transferred to [place] for Neurosurgery evaluation  Multiple co-morbidities, has had runs of v-tach which were investigated  Worked with physical therapy and occupational therapy  Is being discharged today to [facility]  Will follow up with Neurosurgery and PCP regarding events of recent hospitalization  Discharged on home meds and pain controlled  No trauma follow-up required  Significant Findings, Care, Treatment and Services Provided:   Xr Elbow 2 Vw Left    Result Date: 4/25/2018  Impression: No acute osseous abnormality  Workstation performed: [id number]     Xr Elbow 3+ Vw Right    Result Date: 4/25/2018  Impression: No acute osseous abnormality  Workstation performed: [id number]     Ct Head Wo Contrast    Result Date: 4/25/2018  Impression: Stable bifrontal intraparenchymal hemorrhages, bifrontal subarachnoid hemorrhages, bifrontal subdural hemorrhages and midline/left supratentorial subdural hemorrhage  No new findings  Workstation performed: [id number]     Ct Head Wo Contrast    Result Date: 4/25/2018  Impression: 1  Slight progression of the multicompartmental intracranial hemorrhages  Specifically, interval development of bifrontal hemorrhagic contusions and enlarging subdural hemorrhages  2   Cerebral atrophy with chronic small vessel ischemic change  I personally discussed this study with Kana Acuna on 4/25/2018 at 10:56 AM  Workstation performed: [id number]     Ct Head Without Contrast    Result Date: 4/24/2018  Impression: Mild left parietal scalp soft tissue swelling/contusion  6 x 5 mm mid parafalcine hemorrhage  This is superior to the 3rd ventricle  Minimal left anterolateral frontal subarachnoid hemorrhage  Left anterior frontal subdural hemorrhage maximum thickness 2 mm  Minimal left parietal subdural hemorrhage maximum thickness 3 to 4 mm  This is over the posterior convexity underlying the scalp contusion  No acute skull fracture  Chronic microangiopathy  I personally discussed this study with Power Castellano on 4/24/2018 at 10:14 AM  Workstation performed: [id number]     Ct Spine Cervical Wo Contrast    Result Date: 4/25/2018  Impression: No cervical spine fracture or traumatic malalignment    Workstation performed: [id number]     Ct Cervical Spine Without Contrast    Result Date: 4/24/2018  Impression: No cervical spine fracture or traumatic malalignment  Moderate multilevel cervical degenerative changes  2 mm and less biapical lung nodules in retrospect unchanged since September 15, 2017  Based on current Fleischner Society 2017 Guidelines on incidental pulmonary nodule, no routine follow-up is needed if the patient is considered low risk for lung cancer  If the patient is considered high risk for lung cancer, 12 month follow-up non-contrast chest CT is recommended  Workstation performed: [id number]     Ct Spine Thoracic & Lumbar Wo Contrast    Result Date: 4/25/2018  Impression: Minimal loss of height of the L3 vertebral body is likely chronic  No acute fracture of the thoracolumbar spine  Workstation performed: [id number]       Complications: none    Discharge Diagnosis: S/P Mechanical Fall  SDH  Intraparencymal hematoma  Atrial fibrillation  DM2  Parkinson's disease  CAD  Malnutrition    Resolved Problems  Date Reviewed: 4/26/2018    None          Condition at Discharge: stable         Discharge instructions/Information to patient and family:   See after visit summary for information provided to patient and family  Provisions for Follow-Up Care:  See after visit summary for information related to follow-up care and any pertinent home health orders  PCP: Temitope Cardoso    Disposition: [place]    Planned Readmission: No    Discharge Statement   I spent 30 minutes discharging the patient  This time was spent on the day of discharge  I had direct contact with the patient on the day of discharge  Additional documentation is required if more than 30 minutes were spent on discharge  Discharge Medications:  See after visit summary for reconciled discharge medications provided to patient and family

## 2018-04-27 NOTE — ASSESSMENT & PLAN NOTE
4/24 CT head wo: bilateral subdural hematoma, left parietal subdural hematoma and mild parafalcine hemorrhage        4/25 CT head wo: blossoming of bilateral frontal contusions with surrounding edema        4/25 CT head wo: stable   - Continue to hold AC/AP per NSx  - Keppra x 1 wk  - D/C ready

## 2018-04-27 NOTE — PROGRESS NOTES
Progress Note - Dia Bernardo 1940, 68 y o  male MRN: [id number]    Unit/Bed#: [facility] 907-01 Encounter: [id number]    Primary Care Provider: Evert Story   Date and time admitted to hospital: 4/24/2018 12:28 PM        Elevated serum creatinine   Assessment & Plan    - Slightly elevated from baseline at 1 40, 1 41  - Patient states he is eating and drinking as normal, normal urination  - Should f/u with PCP with repeat BMP        Parkinson disease ([facility] [address] )   Assessment & Plan    - continue home medications         Atrial fibrillation [facility])   Assessment & Plan    - Home coumadin held  - Vtach last night, 20 beats, self-terminated  No further issues overnight  - Electrolytes WNL this AM        * SDH (subdural hematoma) (HCC)   Assessment & Plan    4/24 CT head wo: bilateral subdural hematoma, left parietal subdural hematoma and mild parafalcine hemorrhage        4/25 CT head wo: blossoming of bilateral frontal contusions with surrounding edema        4/25 CT head wo: stable   - Continue to hold AC/AP per NSx  - Keppra x 1 wk  - D/C ready                  Subjective/Objective   Chief Complaint: NOne    Subjective: No complaints from overnight- had 20 beat run of Vtach around 5 pm yesterday, given 40 meq KCL & 2 mg Magnesium  No further episodes overnight   Denies headache, chest pain, SOB today    Objective:     Meds/Allergies   Prescriptions Prior to Admission   Medication Sig Dispense Refill Last Dose    aspirin (ECOTRIN LOW STRENGTH) 81 mg EC tablet Take 81 mg by mouth daily   4/24/2018 at Unknown time    calcium carbonate-vitamin D (OSCAL-D) 500 mg-200 units per tablet Take 1 tablet by mouth 2 (two) times a day with meals   4/24/2018 at Unknown time    carbidopa-levodopa (SINEMET)  mg per tablet Take 1 tablet by mouth 3 (three) times a day   4/24/2018 at Unknown time    carvedilol (COREG) 6 25 mg tablet Take 6 25 mg by mouth 2 (two) times a day with meals   4/24/2018 at Unknown time    escitalopram (LEXAPRO) 10 mg tablet Take 15 mg by mouth daily   4/24/2018 at Unknown time    ezetimibe-simvastatin (VYTORIN) 10-20 mg per tablet Take 1 tablet by mouth daily at bedtime   4/24/2018 at Unknown time    furosemide (LASIX) 40 mg tablet Take 40 mg by mouth 2 (two) times a day Pt only takes when he gains more than two pounds a day   4/24/2018 at Unknown time    lisinopril (ZESTRIL) 2 5 mg tablet Take 2 5 mg by mouth daily   4/24/2018 at Unknown time    nitroglycerin (NITROSTAT) 0 4 mg SL tablet Place 0 4 mg under the tongue every 5 (five) minutes as needed for chest pain   4/24/2018 at Unknown time    spironolactone (ALDACTONE) 25 mg tablet Take 25 mg by mouth daily   4/24/2018 at Unknown time    warfarin (COUMADIN) 2 5 mg tablet Take 1 25 mg by mouth daily     4/24/2018 at Unknown time    warfarin (COUMADIN) 5 mg tablet Take 2 5 mg by mouth daily 2 5mg every day but friday Friday he takes 5mg     4/24/2018 at Unknown time       Vitals: Blood pressure 98/58, pulse 92, temperature 97 7 °F (36 5 °C), temperature source Oral, resp  rate 18, height 6' 1" (1 854 m), weight 71 2 kg (156 lb 15 5 oz), SpO2 98 %  Body mass index is 20 71 kg/m²   SpO2: SpO2: 98 %    ABG: No results found for: PHART, UNB6OBZ, PO2ART, ANG8CCO, F9POHGTD, BEART, SOURCE      Intake/Output Summary (Last 24 hours) at 04/27/18 0720  Last data filed at 04/27/18 2652   Gross per 24 hour   Intake              590 ml   Output              250 ml   Net              340 ml       Invasive Devices     Peripheral Intravenous Line            Peripheral IV 04/24/18 Right Antecubital 2 days                Nutrition/GI Proph/Bowel Reg: consistent carb    Physical Exam:   Physical Exam:   Gen: A&O, NAD  Cardio: RRR  Lungs: CTAB  Abd: Soft, non distended, non tender    Lab Results:   BMP/CMP:   Lab Results   Component Value Date     04/27/2018    K 3 7 04/27/2018     04/27/2018    CO2 25 04/27/2018    ANIONGAP 9 04/27/2018    BUN 31 (H) 04/27/2018 CREATININE 1 41 (H) 04/27/2018    GLUCOSE 110 04/27/2018    CALCIUM 9 1 04/27/2018    AST 21 04/27/2018    ALT 8 (L) 04/27/2018    ALKPHOS 91 04/27/2018    PROT 6 1 (L) 04/27/2018    BILITOT 4 01 (H) 04/27/2018    EGFR 48 04/27/2018    and CBC:   Lab Results   Component Value Date    WBC 6 78 04/27/2018    HGB 10 5 (L) 04/27/2018    HCT 34 0 (L) 04/27/2018    MCV 87 04/27/2018     04/27/2018    MCH 26 9 04/27/2018    MCHC 30 9 (L) 04/27/2018    RDW 22 9 (H) 04/27/2018    NRBC 0 04/27/2018     Imaging/EKG Studies: Results: I have personally reviewed pertinent reports      Other Studies:   VTE Prophylaxis: Held due to [address]

## 2018-05-03 NOTE — PROGRESS NOTES
Assessment/Plan:    Very pleasant 26-year-old male accompanied by 2 friends, transported from [address] [place] [address] for hospital follow-up, 2 weeks, history of a fall, with subdural hematoma  He arrives by wheelchair  He arrived without imaging, as it is reported scheduled for tonight, imaging is planned at [facility]     They do understand return follow-up is necessary with imaging  Clinically At this time he is awake, alert and oriented there are no focal neurologic deficits on examination, there is no pronator drift, upper extremity strength is 5 x 5 as is lower, reflexes are intact and symmetric  His friends do report that he has a cough, reported nonproductive which is persistent since his fall in addition he is overall generally weaker than usual they report  Further follow-up is planned within the next week, after imaging (CT head) to review and compare with prior study of 4/25/18  He has a history of atrial fibrillation for which he had been treated with Coumadin in the past but that has been discontinued since this event  I did review the need to avoid aspirin, NSAIDs or other anti-platelet agents, indefinitely  In addition activities were reviewed he has to lift no greater than 10 lb he may ambulate as tolerated, he does have a walker and he is encouraged to use that when he ambulates  He is encouraged to avoid frequent bending  These findings, impressions and recommendations are reviewed in great detail with the patient and his friends, they expressed understanding and agreement, their questions were answered completely and to their satisfaction  Follow up has been scheduled  Diagnoses and all orders for this visit:    Subdural hematoma ([address] )          Return in about 1 week (around 5/10/2018) for After completion of CT head  Subjective:      Patient ID: Luis Alberto De Jesus is a 68 y o  male  Very pleasant 26-year-old male    Returns for hospital follow-up  He arrives by wheelchair  He has not had his updated imaging, that is reported to be planned for this evening at [facility]     He has a history of a fall, 4/24/18, he was reported stepping up on a curb, lost his balance and fell backwards striking his head as well as left elbow  No loss of consciousness is reported with this event  He was initially seen at [facility] Emergency Department, and was transferred to [facility] for neurosurgical consultation  He had inpatient stay from 4/24/18 through 4/27/18, at which point he was transferred to [facility] for further rehabilitation  At this time he denies headache, he has known gait and balance instability which is no greater than in the past, he denies difficulty with memory or mentation, he denies bowel or bladder incontinence  He has a history of atrial fibrillation for which she had been on Coumadin in the past this was discontinued with this event, and he remains off aspirin, NSAIDs and or other anti-platelet agents including Coumadin  No other interval changes in medical or surgical history reported at this time  The following portions of the patient's history were reviewed and updated as appropriate: allergies, current medications, past family history, past medical history, past social history and past surgical history  Review of Systems   Constitutional: Negative for activity change, appetite change, chills, diaphoresis, fatigue, fever and unexpected weight change  Eyes: Negative  Respiratory: Positive for cough and shortness of breath  Negative for apnea, choking, chest tightness, wheezing and stridor  Cardiovascular: Negative  Gastrointestinal: Positive for nausea  Negative for abdominal distention, abdominal pain, anal bleeding, blood in stool, constipation, diarrhea, rectal pain and vomiting     Endocrine: Negative  Genitourinary: Negative  Musculoskeletal: Negative  Skin: Negative  Allergic/Immunologic: Negative  Neurological: Positive for dizziness, weakness and headaches  Negative for tremors, seizures, syncope, facial asymmetry, speech difficulty, light-headedness and numbness  Hematological: Negative  Psychiatric/Behavioral: Positive for agitation and sleep disturbance  Negative for behavioral problems, confusion, decreased concentration, dysphoric mood, hallucinations and self-injury  The patient is not nervous/anxious and is not hyperactive  Objective:    Physical Exam   Constitutional: He is oriented to person, place, and time  He appears cachectic  He has a sickly appearance  HENT:   Head: Normocephalic  Eyes: Pupils are equal, round, and reactive to light  Cardiovascular: Normal rate and normal heart sounds  An irregularly irregular rhythm present  Pulmonary/Chest: Effort normal and breath sounds normal    Neurological: He is alert and oriented to person, place, and time  He has a normal Finger-Nose-Finger Test    Skin: Skin is warm and dry  Psychiatric: He has a normal mood and affect  Neurologic Exam     Mental Status   Oriented to person, place, and time  Level of consciousness: alert    Cranial Nerves     CN III, IV, VI   Pupils are equal, round, and reactive to light      Motor Exam   Muscle bulk: decreased  Right arm pronator drift: absent  Left arm pronator drift: absent    Gait, Coordination, and Reflexes     Gait  Gait: (Wheelchair time of visit )    Coordination   Finger to nose coordination: normal

## 2018-05-03 NOTE — LETTER
May 3, 2018     [id number] [address]  [address]  [employer] [email or web address]    Patient: Henrique Karimi   YOB: 1940   Date of Visit: 5/3/2018       Dear Dr Haque Sep: Thank you for referring Mik Cox to me for evaluation  Below are my notes for this consultation  If you have questions, please do not hesitate to call me  I look forward to following your patient along with you  Sincerely,        Ada Hinson PA-C        CC: No Recipients  Ada Hinson PA-C  5/3/2018  3:43 PM  Sign at close encounter  Assessment/Plan:    Very pleasant 49-year-old male accompanied by 2 friends, transported from [address] [place] [address] for hospital follow-up, 2 weeks, history of a fall, with subdural hematoma  He arrives by wheelchair  He arrived without imaging, as it is reported scheduled for tonight, imaging is planned at [facility]     They do understand return follow-up is necessary with imaging  Clinically At this time he is awake, alert and oriented there are no focal neurologic deficits on examination, there is no pronator drift, upper extremity strength is 5 x 5 as is lower, reflexes are intact and symmetric  His friends do report that he has a cough, reported nonproductive which is persistent since his fall in addition he is overall generally weaker than usual they report  Further follow-up is planned within the next week, after imaging (CT head) to review and compare with prior study of 4/25/18  He has a history of atrial fibrillation for which he had been treated with Coumadin in the past but that has been discontinued since this event  I did review the need to avoid aspirin, NSAIDs or other anti-platelet agents, indefinitely  In addition activities were reviewed he has to lift no greater than 10 lb he may ambulate as tolerated, he does have a walker and he is encouraged to use that when he ambulates    He is encouraged to avoid frequent bending  These findings, impressions and recommendations are reviewed in great detail with the patient and his friends, they expressed understanding and agreement, their questions were answered completely and to their satisfaction  Follow up has been scheduled  Diagnoses and all orders for this visit:    Subdural hematoma ([address] )          Return in about 1 week (around 5/10/2018) for After completion of CT head  Subjective:      Patient ID: Sarah Chaney is a 68 y o  male  Very pleasant 40-year-old male  Returns for hospital follow-up  He arrives by wheelchair  He has not had his updated imaging, that is reported to be planned for this evening at [facility]     He has a history of a fall, 4/24/18, he was reported stepping up on a curb, lost his balance and fell backwards striking his head as well as left elbow  No loss of consciousness is reported with this event  He was initially seen at [facility], and was transferred to [facility] for neurosurgical consultation  He had inpatient stay from 4/24/18 through 4/27/18, at which point he was transferred to [facility] care [facility] for further rehabilitation  At this time he denies headache, he has known gait and balance instability which is no greater than in the past, he denies difficulty with memory or mentation, he denies bowel or bladder incontinence  He has a history of atrial fibrillation for which she had been on Coumadin in the past this was discontinued with this event, and he remains off aspirin, NSAIDs and or other anti-platelet agents including Coumadin  No other interval changes in medical or surgical history reported at this time              The following portions of the patient's history were reviewed and updated as appropriate: allergies, current medications, past family history, past medical history, past social history and past surgical history  Review of Systems   Constitutional: Negative for activity change, appetite change, chills, diaphoresis, fatigue, fever and unexpected weight change  Eyes: Negative  Respiratory: Positive for cough and shortness of breath  Negative for apnea, choking, chest tightness, wheezing and stridor  Cardiovascular: Negative  Gastrointestinal: Positive for nausea  Negative for abdominal distention, abdominal pain, anal bleeding, blood in stool, constipation, diarrhea, rectal pain and vomiting  Endocrine: Negative  Genitourinary: Negative  Musculoskeletal: Negative  Skin: Negative  Allergic/Immunologic: Negative  Neurological: Positive for dizziness, weakness and headaches  Negative for tremors, seizures, syncope, facial asymmetry, speech difficulty, light-headedness and numbness  Hematological: Negative  Psychiatric/Behavioral: Positive for agitation and sleep disturbance  Negative for behavioral problems, confusion, decreased concentration, dysphoric mood, hallucinations and self-injury  The patient is not nervous/anxious and is not hyperactive  Objective:    Physical Exam   Constitutional: He is oriented to person, place, and time  He appears cachectic  He has a sickly appearance  HENT:   Head: Normocephalic  Eyes: Pupils are equal, round, and reactive to light  Cardiovascular: Normal rate and normal heart sounds  An irregularly irregular rhythm present  Pulmonary/Chest: Effort normal and breath sounds normal    Neurological: He is alert and oriented to person, place, and time  He has a normal Finger-Nose-Finger Test    Skin: Skin is warm and dry  Psychiatric: He has a normal mood and affect  Neurologic Exam     Mental Status   Oriented to person, place, and time  Level of consciousness: alert    Cranial Nerves     CN III, IV, VI   Pupils are equal, round, and reactive to light      Motor Exam   Muscle bulk: decreased  Right arm pronator drift: absent  Left arm pronator drift: absent    Gait, Coordination, and Reflexes     Gait  Gait: (Wheelchair time of visit )    Coordination   Finger to nose coordination: normal

## 2018-05-03 NOTE — PATIENT INSTRUCTIONS
Continue with restricted activities, lifting no greater than 10 lb, ambulation as tolerated but this is encouraged with a walker and/or supervision  Avoid bending  Further follow-up is planned after CT head is completed to assess for stability of subdural hematoma      As noted avoid aspirin, NSAIDs or other anti-platelet agents

## 2018-05-09 PROBLEM — R65.20 SEVERE SEPSIS (HCC): Status: ACTIVE | Noted: 2018-01-01

## 2018-05-09 PROBLEM — N17.9 ACUTE KIDNEY INJURY (HCC): Status: ACTIVE | Noted: 2018-01-01

## 2018-05-09 PROBLEM — Z71.89 GOALS OF CARE, COUNSELING/DISCUSSION: Status: ACTIVE | Noted: 2018-01-01

## 2018-05-09 PROBLEM — R06.82 TACHYPNEA: Status: ACTIVE | Noted: 2018-01-01

## 2018-05-09 PROBLEM — R79.89 LOW SERUM THYROID STIMULATING HORMONE (TSH): Status: ACTIVE | Noted: 2018-01-01

## 2018-05-09 PROBLEM — R41.0 DISORIENTATION: Status: ACTIVE | Noted: 2018-01-01

## 2018-05-09 PROBLEM — I95.9 HYPOTENSION: Status: ACTIVE | Noted: 2017-01-01

## 2018-05-09 PROBLEM — A41.9 SEVERE SEPSIS (HCC): Status: ACTIVE | Noted: 2018-01-01

## 2018-05-09 NOTE — SEPSIS NOTE
Sepsis Note   Melodie Ahr 68 y o  male MRN: [id number]  Unit/Bed#: ED 08 Encounter: [id number]            Initial Sepsis Screening     Row Name 05/09/18 1837                Is the patient's history suggestive of a new or worsening infection? (!)  Yes (Proceed)  -MONTSE        Suspected source of infection suspect infection, source unknown  -MONTSE        Are two or more of the following signs & symptoms of infection both present and new to the patient? (!)  Yes (Proceed)  -MONTSE        Indicate SIRS criteria Hyperthemia > 38 3C (100 9F); Altered mental status; Tachycardia > 90 bpm;Leukocytosis (WBC > 46334 IJL)  -MONTSE        If the answer is yes to both questions, suspicion of sepsis is present          If severe sepsis is present AND tissue hypoperfusion perists in the hour after fluid resuscitation or lactate > 4, the patient meets criteria for SEPTIC SHOCK          Are any of the following organ dysfunction criteria present within 6 hours of suspected infection and SIRS criteria that are NOT considered to be chronic conditions?         Organ dysfunction INR > 1 5 or aPTT > 60 secs;Creatinine > 2 0 mg/dL; Lactate > 2 0 mmol/L  -MONTSE        Date of presentation of severe sepsis          Time of presentation of severe sepsis          Tissue hypoperfusion persists in the hour after crystalloid fluid administration, evidenced, by either:          Was hypotension present within one hour of the conclusion of crystalloid fluid administration?           Date of presentation of septic shock          Time of presentation of septic shock            User Key  (r) = Recorded By, (t) = Taken By, (c) = Cosigned By    Initials Name Provider Jamaica Davis MD Resident

## 2018-05-09 NOTE — PROGRESS NOTES
Assessment/Plan:     Diagnoses and all orders for this visit:    Tachypnea  -     Ambulatory Referral to Emergency Medicine; Future    Hypotension, unspecified hypotension type  -     Ambulatory Referral to Emergency Medicine; Future    Disorientation  -     Ambulatory Referral to Emergency Medicine; Future    SDH (subdural hematoma) [address] [address] )  -     Ambulatory Referral to Emergency Medicine; Future          Discussion/ Summary:  Mr Shawna Bain is a 68year old male who presents for follow up for intracranial hemorrhages  He comes to the office in a wheelchair  He is accompanied with a staff member from [address] [place] [address]  Mr Shawna Bain is lethargic, tachypnic (48), and hypotensive (BP 80/40)  He nods his head yes when asked if he feels dizzy sitting  He denies pain or headache  911 was called and EMS presented to our office for transport of Mr Shawna Bain to the Emergency Room for evaluation / management  Patient had CT head ([place]) completed 5/8/18 which reports bilateral subacute / chronic subdural hematomas  (left lateral frontal subdural reported 4mm thickness  Right lateral parietal SDH reported 5mm thickness  Reports no shift of midline structures  Reports ventricles normal in appearance)  The 5/8/18 CT head imaging CD and report was provided to EMS transport to take with Mr Shawna Bain to Emergency Room  I spoke with Dr Tesha [place] (Neurosurgeon at [facility]) and notified him of patient's referral to [place] [address] Emergency Room for evaluation/management  Our office notified [address] [place] [address] of Mr Lopez Catching direct referral to Emergency Room  _____________________________________________________________________  Subjective:      Patient ID: Aminah Davidson is a 68 y o  male who presents for follow up for intracranial hemorrhages  He comes to the office in a wheelchair  He is accompanied with a staff member from [address] [place] [address]         HPI  In review -- Mr Shawna Bain has [address] including hypertension, hyperlipidemia, diabetes, Parkinson's disease, CHF, A  Fib, s/p cardiac ablation, and history of stroke who was transferred from [address] [place] [address] and hospitalized at [facility]  4/24/18 s/p fall backwards when he lifted his leg to step up on a curb  He had been on Aspirin and Warfarin at home prior to hospitalization  He was found to have parafalcine and left and right sided subdural hematomas  He developed progression of multicompartmental intracranial hemorrhages and developed bifrontal contusions  He was was followed by neurosurgery service  He did not require neurosurgical intervention during hospitalization  His Coumadin and Aspirin were held  He was recommended outpatient neurosurgical follow up with CT head imaging  He was discharged to [facility] on 4/27/18  Mr Sammy Linares was seen for a clinical visit in our office 5/3/18  He did not have follow up CT head completed prior to the 5/3/18 office visit  Office visit note 5/3/18 indicated Mr Sammy Linares was awake, alert, and oriented at that time  Patient presents today s/p completion of CT head 5/8/18 [facility])  Medical Assistant who roomed Mr Sammy Linares informed me that Mr Sammy Linares was breathing fast and not talking  911 was called  Upon entering room patient is observed sitting in wheelchair  His eyes were closed but he opened eyes to his name  He provided some yes / no verbalization answers to questions (soft voice) and other times nooded head to indicate yes or no  He denies headache  He denies pain  He denies hard time breathing although he is observed to be breathing fast   He indicates he feels dizzy  With assistance of staff he was lifted / transferred to examination table  He indicated he felt better laying        The following portions of the patient's history were reviewed : allergies, current medications, past family history, past medical history, past social history and past surgical history  Review of Systems   Constitutional:        Denies pain   Neurological: Positive for dizziness  Negative for headaches  Lethargic   Psychiatric/Behavioral: Positive for confusion  ROS is limited in setting of setting of lethargy  Objective:      BP (!) 80/40 (Patient Position: Sitting, Cuff Size: Standard)   Pulse 66   Resp (!) 48   Ht 6' 1" (1 854 m)          Physical Exam   Constitutional: He appears distressed (breathing fast)  This is a pale and thin appearing male sitting in wheelchair  He is lethargic  His eyes were closed upon me entering exam room but he opened eyes upon calling name  He periodically closes his eye and will spontaneously open eyes at times or when ask him to open eyes  HENT:   Mucous membranes dry  There is yellowish dried debris on lips   Eyes: Pupils are equal, round, and reactive to light  Cardiovascular:   Pulse regular   Pulmonary/Chest: Tachypnea noted  Skin: Skin is dry  Neurologic Exam     Mental Status   He does not provide verbal reply when asked name, current location, or date  Cranial Nerves     CN III, IV, VI   Pupils are equal, round, and reactive to light  Does not participate for EOM teseting  Facial muscles appear symmetric without apparent droop  He opens his mouth a mild to mod degree when asked to open mouth but would not stick out tongue  Motor Exam He is observed moving both upper extremities purposefully  At one point he was tapping his left foot on ground when sitting in wheelchair  Sensory Exam     Not obtainable in setting of lethargy  Gait, Coordination, and Reflexes   He could not stand on his own  He required assist to transfer him from wheelchair to examination table

## 2018-05-09 NOTE — ED PROVIDER NOTES
ASSESSMENT AND PLAN    Nany Carranza is a 68 y o  male with a history of recent fall from standing, during which he sustained multiple subdural hematomas, presents with altered mental status, and hypotension,  With a blood pressure as low as the 50 systolic, associated with tachycardia  On presentation, his blood pressure is in the 320 systolic, and his heart rate is in the low 100s, with an oxygen  Saturation In the 80s on room air, which improved to the mid 24S after application of 6 L nasal cannula  On exam, he is nonverbal, not following commands, and appears pale and toxic  He has what appears to be dried crusted vomit in his mouth, the behind his teeth,  And is covered in stool  And urine, with significant maceration of his buttocks  His breath sounds are clear, his abdomen is benign, and he displays no obvious signs of infection, however given his incontinence, urinary tract infection is possible  Is also possible that he aspirated based on his HEENT exam,  And clearly significantly depressed mental status  - initial lab work significant for leukocytosis of 14 21, significantly increased from baseline  Patient also displays an acute kidney injury, as well as a lactic acid of 5 6  His BUN is elevated out of proportion of his creatinine, concerning for dehydration   -  Chest x-ray was unremarkable  - a noncontrast CT of the head was obtained which displayed stable intracranial hemorrhages  - I did have a discussion with the patient's power of , who expressed the patient is DNR/DNI  The patient would accept antibiotics for comfort, but there will be no further escalation of care based on the power of   Specifically, the patient will not seek surgical intervention of his intracranial hemorrhages  - due to his fever, altered mental status, concern for aspiration, the patient was started on broad-spectrum antibiotics, including Zosyn to cover for aspiration pathogens      He is also given 2 L of IV fluid resuscitation  - the patient be admitted to medicine service for further evaluation of his altered mental status, likely sepsis  His power-of- plans on arriving to the hospital tomorrow, I believe the patient would benefit from a goals of care discussion between his power  and the medical team to evaluate  Potential further interventions  I discussed this case with the [facility] admitting attending  History  Chief Complaint   Patient presents with    Hypotension     pt was at neurology office where he gets frequent scans r/t chronic subdural  BP noted to 80 systolic and pt became unconcious  pt disoriented at this time, MD at bedside     HPI   this 49-year-old male with history of hyperlipidemia, hypertension, coronary artery disease, presents with altered mental status  And hypotension  The patient was recently discharged from the trauma service on 04/27, after a fall from standing while on supratherapeutic anticoagulation, sustaining Multiple intracranial hemorrhages  The patient was discharged after stable neurologic status, though was made DNR/DNI during his hospitalization  Patient apparently has been deteriorating steadily over the let us several days, and also had a fall from standing approximately 2 days ago, with a head strike  He had a CT scan yesterday for evaluation of the fall, however the result of not come back yet  While at the neurosurgeon office, he was noted to be nonverbal and minimally responsive, which apparently been his baseline over the last several days  However, he also had episode of hypotension with a blood pressure in the 50 systolic, associated with a loss of consciousness which was transient  The patient's blood pressure improved with fluids, any was also noted to be tachycardic with a heart rate in the low 100s during the episode  The patient is unable to provide HPI, is currently nonverbal       It did have a conversation with the patient's power-of-, who stated that over the last several days, the patient has been more lethargic, and the last saw him was yesterday, and at that time he was nonverbal, and did not acknowledge his presence in the room  I also discussed Mr Dulce Shaikh with the nursing home staff, who corroborate information of the power-of-  Prior to Admission Medications   Prescriptions Last Dose Informant Patient Reported?  Taking?   acetaminophen (TYLENOL) 325 mg tablet Unknown at Unknown time  No No   Sig: Take 2 tablets (650 mg total) by mouth every 6 (six) hours as needed for mild pain   calcium carbonate-vitamin D (OSCAL-D) 500 mg-200 units per tablet Unknown at Unknown time  Yes No   Sig: Take 1 tablet by mouth 2 (two) times a day with meals   carbidopa-levodopa (SINEMET)  mg per tablet Unknown at Unknown time  Yes No   Sig: Take 1 tablet by mouth 3 (three) times a day   carvedilol (COREG) 6 25 mg tablet Unknown at Unknown time  Yes No   Sig: Take 6 25 mg by mouth 2 (two) times a day with meals   escitalopram (LEXAPRO) 10 mg tablet Unknown at Unknown time  Yes No   Sig: Take 15 mg by mouth daily   ezetimibe-simvastatin (VYTORIN) 10-20 mg per tablet Unknown at Unknown time  Yes No   Sig: Take 1 tablet by mouth daily at bedtime   furosemide (LASIX) 40 mg tablet Unknown at Unknown time  Yes No   Sig: Take 40 mg by mouth daily Pt only takes when he gains more than two pounds a day     levETIRAcetam (KEPPRA) 500 mg tablet   No No   Sig: Take 1 tablet (500 mg total) by mouth every 12 (twelve) hours for 7 days   nitroglycerin (NITROSTAT) 0 4 mg SL tablet Unknown at Unknown time  Yes No   Sig: Place 0 4 mg under the tongue every 5 (five) minutes as needed for chest pain   senna-docusate sodium (SENOKOT S) 8 6-50 mg per tablet Unknown at Unknown time  No No   Sig: Take 1 tablet by mouth daily at bedtime   spironolactone (ALDACTONE) 25 mg tablet Unknown at Unknown time  Yes No   Sig: Take 25 mg by mouth daily Facility-Administered Medications: None       Past Medical History:   Diagnosis Date    Arthritis     Cardiac disease     [facility] [address] [facility]     Coronary artery disease     Diabetes mellitus [facility] [address] [facility]     Hyperlipidemia     Hypertension     Stroke ([facility] [address] [facility]     7 months ago       Past Surgical History:   Procedure Laterality Date    CARDIAC DEFIBRILLATOR PLACEMENT      with multiple revisions    CARDIAC ELECTROPHYSIOLOGY STUDY AND ABLATION      4/2018    CHOLECYSTECTOMY      CORONARY ANGIOPLASTY WITH STENT PLACEMENT      age 77    EYE SURGERY Bilateral     cataract    JOINT REPLACEMENT Right     knee x 10    LUMBAR SPINE SURGERY      unknown        Family History   Problem Relation Age of Onset    Hypertension Father     Stroke Father     Stroke Brother      I have reviewed and agree with the history as documented  Social History   Substance Use Topics    Smoking status: Former Smoker    Smokeless tobacco: Never Used    Alcohol use No        Review of Systems   Unable to perform ROS: Mental status change       Physical Exam  ED Triage Vitals   Temperature Pulse Respirations Blood Pressure SpO2   05/09/18 1645 05/09/18 1616 05/09/18 1616 05/09/18 1616 05/09/18 1616   (!) 100 9 °F (38 3 °C) 104 (!) 26 94/51 90 %      Temp Source Heart Rate Source Patient Position - Orthostatic VS BP Location FiO2 (%)   05/09/18 1645 05/09/18 1616 05/09/18 1957 05/09/18 1957 --   Rectal Monitor Lying Left arm       Pain Score       --                  Orthostatic Vital Signs  Vitals:    05/10/18 0718 05/10/18 1533 05/10/18 2315 05/11/18 0644   BP: 105/58 106/62 114/63 138/64   Pulse: 91 (!) 108 104 105   Patient Position - Orthostatic VS: Lying Lying Lying Lying       Physical Exam   Constitutional:   Awake  Lethargic  Opening eyes to voice, but not answering questions appropriately  Emaciated and chronically ill-appearing   HENT:   Mucous membranes are dry, head is atraumatic  There is old vomit in the patient's oropharynx, crusted against his teeth  Eyes: Pupils are equal, round, and reactive to light  No scleral icterus  Neck: Normal range of motion  Neck supple  Cardiovascular: Regular rhythm and normal heart sounds  No murmur heard  Tachycardic   Pulmonary/Chest: Effort normal  No respiratory distress  Rhonchorous breath sounds in all lung fields  Bibasilar crackles  No wheezing appreciated  Increased respiratory effort  Abdominal: Soft  He exhibits no distension  There is no tenderness  Musculoskeletal: Normal range of motion  He exhibits no edema  No deformities appreciated   Neurological: GCS eye subscore is 3  GCS verbal subscore is 2  GCS motor subscore is 5  Lethargic  Localizing pain in all extremities  Skin: Skin is warm and dry  No rash noted  No pallor  ED Medications  Medications   ondansetron (ZOFRAN) injection 4 mg (not administered)   morphine (PF) 4 mg/mL injection 4 mg (4 mg Intravenous Given 5/11/18 1141)   PHENobarbital 65 mg/mL injection 30 mg (not administered)   ondansetron (ZOFRAN) injection 4 mg (4 mg Intravenous Given 5/9/18 1637)   vancomycin (VANCOCIN) 1,750 mg in sodium chloride 0 9 % 500 mL IVPB (1,750 mg Intravenous New Bag 5/9/18 1751)   piperacillin-tazobactam (ZOSYN) 3 375 g in sodium chloride 0 9 % 50 mL IVPB (0 g Intravenous Stopped 5/9/18 1744)   multi-electrolyte (ISOLYTE-S PH 7 4) bolus 1,000 mL (1,000 mL Intravenous New Bag 5/9/18 1744)       Diagnostic Studies  Results Reviewed     Procedure Component Value Units Date/Time    Blood culture #1 [[id number]] Collected:  05/09/18 1631    Lab Status:  Preliminary result Specimen:  Blood from Arm, Right Updated:  05/10/18 1901     Blood Culture No Growth at 24 hrs  Blood culture #2 [[id number]] Collected:  05/09/18 1631    Lab Status:  Preliminary result Specimen:  Blood from Arm, Left Updated:  05/10/18 1901     Blood Culture No Growth at 24 hrs      UA w Reflex to Microscopic w Reflex to Culture [[id number]]  (Abnormal) Collected:  05/10/18 1138    Lab Status:  Final result Specimen:  Urine from Urine, Clean Catch Updated:  05/10/18 1158     Color, UA Yellow     Clarity, UA Cloudy     Specific Gravity, UA 1 014     pH, UA 5 0     Leukocytes, UA Moderate (A)     Nitrite, UA Negative     Protein, UA Trace (A) mg/dl      Glucose, UA Negative mg/dl      Ketones, UA Negative mg/dl      Urobilinogen, UA 1 0 E U /dl      Bilirubin, UA Negative     Blood, UA Negative    Lactic acid, plasma [[id number]]  (Normal) Collected:  05/09/18 1932    Lab Status:  Final result Specimen:  Blood from Arm, Right Updated:  05/09/18 2007     LACTIC ACID 1 3 mmol/L     Narrative:         Result may be elevated if tourniquet was used during collection  T4, free [id number]  (Abnormal) Collected:  05/09/18 1631    Lab Status:  Final result Specimen:  Blood from Arm, Right Updated:  05/09/18 2002     Free T4 5 42 (HH) ng/dL     TSH, 3rd generation with T4 reflex [[id number]]  (Abnormal) Collected:  05/09/18 1631    Lab Status:  Final result Specimen:  Blood from Arm, Right Updated:  05/09/18 1908     TSH 3RD GENERATON <0 007 (L) uIU/mL     Narrative:         Patients undergoing fluorescein dye angiography may retain small amounts of fluorescein in the body for 48-72 hours post procedure  Samples containing fluorescein can produce falsely depressed TSH values  If the patient had this procedure,a specimen should be resubmitted post fluorescein clearance      Blood gas, venous [[id number]]  (Abnormal) Collected:  05/09/18 1800    Lab Status:  Final result Specimen:  Blood from Arm, Left Updated:  05/09/18 1807     pH, Butch 7 385     pCO2, Butch 45 4 mm Hg      pO2, Butch 63 2 (H) mm Hg      HCO3, Butch 26 6 mmol/L      Base Excess, Butch 1 2 mmol/L      O2 Content, Butch 12 6 ml/dL      O2 HGB, VENOUS 86 5 (H) %     CBC and differential [[id number]]  (Abnormal) Collected:  05/09/18 1631    Lab Status:  Final result Specimen:  Blood from Arm, Right Updated:  05/09/18 1746     WBC 14 21 (H) Thousand/uL      RBC 3 93 Million/uL      Hemoglobin 10 6 (L) g/dL      Hematocrit 33 1 (L) %      MCV 84 fL      MCH 27 0 pg      MCHC 32 0 g/dL      RDW 22 5 (H) %      Platelets 765 Thousands/uL      nRBC 0 /100 WBCs     Narrative: This is an appended report  These results have been appended to a previously verified report  Lactic Acid x2 [[id number]]  (Abnormal) Collected:  05/09/18 1631    Lab Status:  Final result Specimen:  Blood from Arm, Right Updated:  05/09/18 1730     LACTIC ACID 5 6 (HH) mmol/L     Narrative:         Result may be elevated if tourniquet was used during collection  Comprehensive metabolic panel [[id number]]  (Abnormal) Collected:  05/09/18 1631    Lab Status:  Final result Specimen:  Blood from Arm, Right Updated:  05/09/18 1711     Sodium 144 mmol/L      Potassium 4 0 mmol/L      Chloride 104 mmol/L      CO2 27 mmol/L      Anion Gap 13 mmol/L      BUN 75 (H) mg/dL      Creatinine 1 78 (H) mg/dL      Glucose 157 (H) mg/dL      Calcium 9 7 mg/dL      AST 32 U/L      ALT <6 (L) U/L      Alkaline Phosphatase 115 U/L      Total Protein 5 8 (L) g/dL      Albumin 2 6 (L) g/dL      Total Bilirubin 2 88 (H) mg/dL      eGFR 36 ml/min/1 73sq m     Narrative:         National Kidney Disease Education Program recommendations are as follows:  GFR calculation is accurate only with a steady state creatinine  Chronic Kidney disease less than 60 ml/min/1 73 sq  meters  Kidney failure less than 15 ml/min/1 73 sq  meters      APTT [[id number]]  (Abnormal) Collected:  05/09/18 1631    Lab Status:  Final result Specimen:  Blood from Arm, Right Updated:  05/09/18 1705     PTT 37 (H) seconds     Protime-INR [[id number]]  (Abnormal) Collected:  05/09/18 1631    Lab Status:  Final result Specimen:  Blood from Arm, Right Updated:  05/09/18 1705     Protime 22 1 (H) seconds      INR 1 91 (H)                 XR chest portable   Final Result by Svitlana Lagunas MD (05/10 0836)   Stable cardiomegaly without acute pulmonary disease  Workstation performed: [id number]         CT head without contrast   Final Result by Michael Morgan MD (05/09 1754)   1  Stable appearance of the bilateral subdural hematomas  2   Stable chronic microvascular imaging changes and chronic appearing bilateral infarcts  Workstation performed: [id number]         XR chest 1 view portable   Final Result by Patricia Nagel DO (05/09 1659)      No acute cardiopulmonary disease  Workstation performed: [id number]               Procedures  Procedures      Phone Consults  ED Phone Contact    ED Course                         Initial Sepsis Screening     [address] Name 05/09/18 5934                Is the patient's history suggestive of a new or worsening infection? (!)  Yes (Proceed)  -MONTSE        Suspected source of infection suspect infection, source unknown  -MONTSE        Are two or more of the following signs & symptoms of infection both present and new to the patient? (!)  Yes (Proceed)  -MONTSE        Indicate SIRS criteria Hyperthemia > 38 3C (100 9F); Altered mental status; Tachycardia > 90 bpm;Leukocytosis (WBC > 26034 IJL)  -MONTSE        If the answer is yes to both questions, suspicion of sepsis is present          If severe sepsis is present AND tissue hypoperfusion perists in the hour after fluid resuscitation or lactate > 4, the patient meets criteria for SEPTIC SHOCK          Are any of the following organ dysfunction criteria present within 6 hours of suspected infection and SIRS criteria that are NOT considered to be chronic conditions?         Organ dysfunction INR > 1 5 or aPTT > 60 secs;Creatinine > 2 0 mg/dL; Lactate > 2 0 mmol/L  -MONTSE        Date of presentation of severe sepsis          Time of presentation of severe sepsis          Tissue hypoperfusion persists in the hour after crystalloid fluid administration, evidenced, by either:          Was hypotension present within one hour of the conclusion of crystalloid fluid administration?         Date of presentation of septic shock          Time of presentation of septic shock            User Key  (r) = Recorded By, (t) = Taken By, (c) = Cosigned By    Initials Name Provider Type    Sukhi Silva MD Resident                  [facility]  CritCare Time    Disposition  Final diagnoses:   Severe sepsis ([facility] [address] )   Lactic acidosis   At high risk for aspiration   Hypotension   LATOYA (acute kidney injury) ([facility] [address] )   Toxic metabolic encephalopathy   Dehydration   History of subdural hematoma     Time reflects when diagnosis was documented in both MDM as applicable and the Disposition within this note     Time User Action Codes Description Comment    5/9/2018  6:28 PM Creasie [place] Add [A41 9,  R65 20] Severe sepsis ([facility] [address] )     5/9/2018  6:28 PM Creasie [place] Add [E87 2] Lactic acidosis     5/9/2018  6:28 PM Creasie [place] Add [Z91 89] At high risk for aspiration     5/9/2018  6:28 PM Creasie [place] Add [I95 9] Hypotension     5/9/2018  6:28 PM Creasie [place] Add [N17 9] LATOYA (acute kidney injury) ([facility] [address] )     5/9/2018  6:28 PM UMAIR Cano  [address] Toxic metabolic encephalopathy     5/9/2018  6:29 PM Creasie [place] Add [E86 0] Dehydration     5/9/2018  6:30 PM Erasto Sniff [Z86 79] History of subdural hematoma     5/10/2018  8:38 AM Calvin ALAN Add [E46] Malnutrition [facility])       ED Disposition     ED Disposition Condition Comment    Admit  Case was discussed with [facility] admitting and the patient's admission status was agreed to be Admission Status: inpatient status to the service of Dr Aidan Katz           Follow-up Information    None       Current Discharge Medication List      CONTINUE these medications which have NOT CHANGED    Details   acetaminophen (TYLENOL) 325 mg tablet Take 2 tablets (650 mg total) by mouth every 6 (six) hours as needed for mild pain  Qty: 30 tablet, Refills: 0    Associated Diagnoses: SDH (subdural hematoma) (HCC)      calcium carbonate-vitamin D (OSCAL-D) 500 mg-200 units per tablet Take 1 tablet by mouth 2 (two) times a day with meals      carbidopa-levodopa (SINEMET)  mg per tablet Take 1 tablet by mouth 3 (three) times a day      carvedilol (COREG) 6 25 mg tablet Take 6 25 mg by mouth 2 (two) times a day with meals      escitalopram (LEXAPRO) 10 mg tablet Take 15 mg by mouth daily      ezetimibe-simvastatin (VYTORIN) 10-20 mg per tablet Take 1 tablet by mouth daily at bedtime      furosemide (LASIX) 40 mg tablet Take 40 mg by mouth daily Pt only takes when he gains more than two pounds a day        levETIRAcetam (KEPPRA) 500 mg tablet Take 1 tablet (500 mg total) by mouth every 12 (twelve) hours for 7 days  Qty: 14 tablet, Refills: 0    Associated Diagnoses: SDH (subdural hematoma) (HCC)      nitroglycerin (NITROSTAT) 0 4 mg SL tablet Place 0 4 mg under the tongue every 5 (five) minutes as needed for chest pain      senna-docusate sodium (SENOKOT S) 8 6-50 mg per tablet Take 1 tablet by mouth daily at bedtime  Qty: 30 tablet, Refills: 0    Associated Diagnoses: SDH (subdural hematoma) (HCC)      spironolactone (ALDACTONE) 25 mg tablet Take 25 mg by mouth daily           No discharge procedures on file  ED Provider  Attending physically available and evaluated Henrique Karimi I managed the patient along with the ED Attending      Electronically Signed by         Azael Benavides MD  05/11/18 9665

## 2018-05-09 NOTE — ED NOTES
Family called requesting someone from pastoral care pray for the patient at this time   Sister Rufus Crockerr at [address], RN  05/09/18 3303

## 2018-05-09 NOTE — ED ATTENDING ATTESTATION
Robbie Caldwell MD, saw and evaluated the patient  I have discussed the patient with the resident/non-physician practitioner and agree with the resident's/non-physician practitioner's findings, Plan of Care, and MDM as documented in the resident's/non-physician practitioner's note, except where noted  All available labs and Radiology studies were reviewed  At this point I agree with the current assessment done in the Emergency Department  I have conducted an independent evaluation of this patient a history and physical is as follows: The patient had a recent admission for bilateral subdural hematomas and coagulopathy he was admitted to the trauma service and discharged to a nursing home    no surgical intervention was performed during the admission the patient was treated with 4 factor [address] and vitamin K      apparently he was at a follow-up appointment today and had a change in mental status and episodes of vomiting he is here with a disc of a CT head   no new trauma  Patient had an episode of hypotension  He may have aspirated  On exam he is very pale appearing [place] coma 9 to 10      mucous membranes are very dry appear the patient also has dried vomitus around his mouth as well no blood lungs rhonchi noted heart regular abdomen very thin well-healed surgical scars noted the mid abdomen no obvious mass extremities no deformity neurologic exam decreased mental status face is symmetric he is able to grab my hand when commanded to do this  Impression:  Mental status change with episode of hypotension  Worsening subdural hematoma versus metabolic versus anemia versus infectious process    Patient has a sign DNR  on his chart  We will try to contact the nursing home and any family      Critical Care Time  The patient presented with a condition in which there was a high probability of imminent or life-threatening deterioration, and critical care services (excluding separately billable procedures) totalled 30-74 minutes          Procedures

## 2018-05-09 NOTE — PROGRESS NOTES
05/09/18 1250 [facility]   Spiritual Beliefs/Perceptions   Support Systems Friends/neighbors   Plan of Care   Comments Nurse called POA wanted pt  anointed  Called fr Ryann Ann     Assessment Completed by: Unit visit

## 2018-05-09 NOTE — LETTER
May 9, 2018     [address]  [address]  [address]    Patient: Dia Bernardo   YOB: 1940   Date of Visit: 5/9/2018       Dear Dr Herminia Trimble: Thank you for referring Sachin Mann to me for evaluation  Below are my notes for this consultation  If you have questions, please do not hesitate to call me  I look forward to following your patient along with you  Sincerely,        Lizy Maldonado PA-C        CC: DO Lizy Aaron PA-C  5/9/2018  6:48 PM  Sign at close encounter  Assessment/Plan:     Diagnoses and all orders for this visit:    Tachypnea  -     Ambulatory Referral to Emergency Medicine; Future    Hypotension, unspecified hypotension type  -     Ambulatory Referral to Emergency Medicine; Future    Disorientation  -     Ambulatory Referral to Emergency Medicine; Future    SDH (subdural hematoma) ([facility] )  -     Ambulatory Referral to Emergency Medicine; Future          Discussion/ Summary:  Mr Darryl Sanabria is a 68year old male who presents for follow up for intracranial hemorrhages  He comes to the office in a wheelchair  He is accompanied with a staff member from [address] [place] [address]  Mr Darryl Sanabria is lethargic, tachypnic (48), and hypotensive (BP 80/40)  He nods his head yes when asked if he feels dizzy sitting  He denies pain or headache  911 was called and EMS presented to our office for transport of Mr Darryl Sanabria to the Emergency Room for evaluation / management  Patient had CT head ([place]) completed 5/8/18 which reports bilateral subacute / chronic subdural hematomas  (left lateral frontal subdural reported 4mm thickness  Right lateral parietal SDH reported 5mm thickness  Reports no shift of midline structures  Reports ventricles normal in appearance)  The 5/8/18 CT head imaging CD and report was provided to EMS transport to take with Mr Darryl Sanabria to Emergency Room      I spoke with Dr Blanca Jama (Neurosurgeon at [place] [address] [place]) and notified him of patient's referral to [place] [address] Emergency Room for evaluation/management  Our office notified [address] of Mr  Claudetta Heck direct referral to Emergency Room  _____________________________________________________________________  Subjective:      Patient ID: Osman Tovar is a 68 y o  male who presents for follow up for intracranial hemorrhages  He comes to the office in a wheelchair  He is accompanied with a staff member from [address]  HPI  In review -- Mr Pearl Boggs has PMH including hypertension, hyperlipidemia, diabetes, Parkinson's disease, CHF, A  Fib, s/p cardiac ablation, and history of stroke who was transferred from [address] [place] [address] and hospitalized at [facility]  4/24/18 s/p fall backwards when he lifted his leg to step up on a curb  He had been on Aspirin and Warfarin at home prior to hospitalization  He was found to have parafalcine and left and right sided subdural hematomas  He developed progression of multicompartmental intracranial hemorrhages and developed bifrontal contusions  He was was followed by neurosurgery service  He did not require neurosurgical intervention during hospitalization  His Coumadin and Aspirin were held  He was recommended outpatient neurosurgical follow up with CT head imaging  He was discharged to [facility] on 4/27/18  Mr Pearl Boggs was seen for a clinical visit in our office 5/3/18  He did not have follow up CT head completed prior to the 5/3/18 office visit  Office visit note 5/3/18 indicated Mr Pearl Boggs was awake, alert, and oriented at that time  Patient presents today s/p completion of CT head 5/8/18 [facility])  Medical Assistant who roomed Mr Pearl Boggs informed me that Mr Pearl Boggs was breathing fast and not talking  911 was called  Upon entering room patient is observed sitting in wheelchair  His eyes were closed but he opened eyes to his name    He provided some yes / no verbalization answers to questions (soft voice) and other times nooded head to indicate yes or no  He denies headache  He denies pain  He denies hard time breathing although he is observed to be breathing fast   He indicates he feels dizzy  With assistance of staff he was lifted / transferred to examination table  He indicated he felt better laying  The following portions of the patient's history were reviewed : allergies, current medications, past family history, past medical history, past social history and past surgical history  Review of Systems   Constitutional:        Denies pain   Neurological: Positive for dizziness  Negative for headaches  Lethargic   Psychiatric/Behavioral: Positive for confusion  ROS is limited in setting of setting of lethargy  Objective:      BP (!) 80/40 (Patient Position: Sitting, Cuff Size: Standard)   Pulse 66   Resp (!) 48   Ht 6' 1" (1 854 m)          Physical Exam   Constitutional: He appears distressed (breathing fast)  This is a pale and thin appearing male sitting in wheelchair  He is lethargic  His eyes were closed upon me entering exam room but he opened eyes upon calling name  He periodically closes his eye and will spontaneously open eyes at times or when ask him to open eyes  HENT:   Mucous membranes dry  There is yellowish dried debris on lips   Eyes: Pupils are equal, round, and reactive to light  Cardiovascular:   Pulse regular   Pulmonary/Chest: Tachypnea noted  Skin: Skin is dry  Neurologic Exam     Mental Status   He does not provide verbal reply when asked name, current location, or date  Cranial Nerves     CN III, IV, VI   Pupils are equal, round, and reactive to light  Does not participate for EOM teseting  Facial muscles appear symmetric without apparent droop  He opens his mouth a mild to mod degree when asked to open mouth but would not stick out tongue        Motor Exam He is observed moving both upper extremities purposefully  At one point he was tapping his left foot on ground when sitting in wheelchair  Sensory Exam     Not obtainable in setting of lethargy  Gait, Coordination, and Reflexes   He could not stand on his own  He required assist to transfer him from wheelchair to examination table

## 2018-05-09 NOTE — PROGRESS NOTES
05/09/18 1900   Clinical Encounter Type   Visited With Patient; Health care provider   Routine Visit Introduction   Continue Visiting Yes   Crisis Visit Patient actively dying   Referral From Nurse   Referral To    Gnosticist Encounters   Gnosticist Needs Prayer   Sacramental Encounters   Sacrament of Sick-Anointing Family requested anointing

## 2018-05-10 NOTE — ASSESSMENT & PLAN NOTE
Probable sepsis POA evidencd by fever, lactic acidosis and tachypnea  Source of infection not entirely clear  Likely aspiration pneumonia given history  Current chest x-ray negative    Will repeat chest x-ray in a m  after hydration  Will get procalcitonin levels  Urinalysis at this point would not be a bad idea  Follow-up cultures  Leukocytosis not high  Continue broad-spectrum coverage/IV fluid  POA did not want to continue with pursuing, he is made comfort care

## 2018-05-10 NOTE — CASE MANAGEMENT
Pending   Authorization #: [id number]      Thank you,  [facility] in the [employer] for 2017  Network Utilization Review Department  Phone: [phone]; Fax [phone]  ATTENTION: The Network Utilization Review Department is now centralized for our 7 Facilities  Make a note that we have a new phone and fax numbers for our Department  Please call with any questions or concerns to [phone] and carefully follow the prompts so that you are directed to the right person  All voicemails are confidential  Fax any determinations, approvals, denials, and requests for initial or continue stay review clinical to [phone]  Due to HIGH CALL volume, it would be easier if you could please send faxed requests to expedite your requests and in part, help us provide discharge notifications faster

## 2018-05-10 NOTE — CASE MANAGEMENT
Initial Clinical Review    Admission: Date/Time/Statement: 5/9/18 @ 1828     Orders Placed This Encounter   Procedures    Inpatient Admission (expected length of stay for this patient is greater than two midnights)     Standing Status:   Standing     Number of Occurrences:   1     Order Specific Question:   Admitting Physician     Answer:   Stephanie Parker     Order Specific Question:   Level of Care     Answer:   Level 2 Stepdown / HOT [14]     Order Specific Question:   Estimated length of stay     Answer:   More than 2 Midnights     Order Specific Question:   Certification     Answer:   I certify that inpatient services are medically necessary for this patient for a duration of greater than two midnights  See H&P and MD Progress Notes for additional information about the patient's course of treatment  ED: Date/Time/Mode of Arrival:   ED Arrival Information     Expected Arrival Acuity Means of Arrival Escorted By Service Admission Type    - 5/9/2018 16:09 Emergent Ambulance [facility] [address] [facility]    Arrival Complaint    syncope          Chief Complaint:   Chief Complaint   Patient presents with    Hypotension     pt was at neurology office where he gets frequent scans r/t chronic subdural  BP noted to 80 systolic and pt became unconcious  pt disoriented at this time, MD at bedside       History of Illness:        Catherine Andrea is a 68 y o  male with recent SDH following traumatic fall, hypertension, hyperlipidemia, Parkinson's disease, CHF, AFib status post cardiac ablation, history of stroke who presents with hypotension  He was seen by Neurosurgery for follow up in the office when he was noted lethargic and hypotensive with blood pressure of 80/40  In the ED, he appeared pale with Reva coma scale of 9 to10  There was dried vomitus around his mouth noted  Vaishnavi Settle   He has temp of a 100 9° with tachypnea       ED Vital Signs:   ED Triage Vitals   Temperature Pulse Respirations Blood Pressure SpO2   05/09/18 1645 05/09/18 1616 05/09/18 1616 05/09/18 1616 05/09/18 1616   (!) 100 9 °F (38 3 °C) 104 (!) 26 94/51 90 %          Wt Readings from Last 1 Encounters:   05/09/18 66 5 kg (146 lb 9 7 oz)       Vital Signs (abnormal):     05/09/18 1957  99 7 °F (37 6 °C)  90   28  100/53  100 %  Nasal cannula   05/09/18 1845  --  84   29  113/58  100 %  --   05/09/18 1815  --  86   29  101/54  100 %  --   05/09/18 1715  --  92   30  100/56  100 %  --   05/09/18 1645   100 9 °F (38 3 °C)  94   30   89/54  99 %  --      05/09 1616 05/09 1957 05/09 2324 05/10 0158 05/10 0225 05/10 0718 05/10 0724   O2 Device Nasal c  Nasal c  Nasal c  Nasal c  Nasal c  Nasal c  Nasal c  O2 Therapy     Oxygen     O2 Flow Rate (L/min) (L/min) 4 5 3 2 3  3       Abnormal Labs/Diagnostic Test Results:      Lab Units 05/09/18  1631   WBC Thousand/uL 14 21*   HEMOGLOBIN g/dL 10 6*   HEMATOCRIT % 33 1*   LYMPHO PCT % 0*   MONO PCT MAN % 1*         Results from last 7 days  Lab Units 05/09/18  1631   BUN mg/dL 75*   CREATININE mg/dL 1 78*   TOTAL PROTEIN g/dL 5 8*   BILIRUBIN TOTAL mg/dL 2 88*   ALT U/L <6*   GLUCOSE RANDOM mg/dL 157*         Results from last 7 days  Lab Units 05/09/18  1631   INR   1 91*     CT head without contrast   1  Stable appearance of the bilateral subdural hematomas  2   Stable chronic microvascular imaging changes and chronic appearing bilateral infarcts           ED Treatment:   Medication Administration from 05/09/2018 1609 to 05/09/2018 1949       Date/Time Order Dose Route     05/09/2018 1637 ondansetron (ZOFRAN) injection 4 mg 4 mg Intravenous     05/09/2018 1637 sodium chloride 0 9 % infusion 1,000 mL/hr Intravenous     05/09/2018 1751 vancomycin (VANCOCIN) 1,750 mg in sodium chloride 0 9 % 500 mL IVPB 1,750 mg Intravenous     05/09/2018 1727 piperacillin-tazobactam (ZOSYN) 3 375 g in sodium chloride 0 9 % 50 mL IVPB 3 375 g Intravenous     05/09/2018 1742 multi-electrolyte (ISOLYTE-S PH 7 4) bolus 1,000 mL 1,000 mL Intravenous       Past Medical/Surgical History: Active Ambulatory Problems     Diagnosis Date Noted    Hypotension 09/15/2017    SDH (subdural hematoma) (HCC) 04/24/2018    Intraparenchymal hematoma of brain (HCC) 04/24/2018    Atrial fibrillation (HCC) 04/24/2018    Parkinson disease ([facility] ) 04/24/2018    Diabetes mellitus type 2, diet-controlled ([facility] ) 04/24/2018    CAD (coronary artery disease) 04/24/2018    History of implantable cardioverter-defibrillator (ICD) placement 04/24/2018    CH[facility]congestive [facility] failure) [facility] ) 04/24/2018    Fall 04/25/2018    MCI (mild cognitive impairment) 04/25/2018    Ambulatory dysfunction 04/25/2018    Malnutrition ([facility] ) 04/25/2018    Physical deconditioning 04/25/2018    Hyperbilirubinemia 04/26/2018    Elevated serum creatinine 04/27/2018    Disorientation 05/09/2018    Tachypnea 05/09/2018       Past Medical History:    Arthritis    Cardiac disease    CHF (congestive heart failure) ([facility] )    Coronary artery disease    Diabetes mellitus ([facility]    Hyperlipidemia    Hypertension    Stroke ([facility] )       Admitting Diagnosis: Dehydration [E86 0]  Lactic acidosis [E87 2]  Syncope [R55]  Hypotension [I95 9]  LATOYA (acute kidney injury) ([facility] ) [N17 9]  At high risk for aspiration [Q85 92]  Toxic metabolic encephalopathy [K48]  Severe sepsis ([facility] ) [A41 9, R65 20]  History of subdural hematoma [Z86 79]    Age/Sex: 68 y o  male    Assessment/Plan:       Severe sepsis ([facility] )   Assessment & Plan     Probable sepsis POA evidencd by fever, lactic acidosis and tachypnea  Source of infection not entirely clear  Likely aspiration pneumonia given history  Current chest x-ray negative  Will repeat chest x-ray in a m  after hydration  Follow-up cultures  Continue broad-spectrum coverage/IV fluid             Acute kidney injury [facility])   Assessment & Plan     Due to sepsis    Hold diuretics          SDH (subdural hematoma) ([facility] )   Assessment & Plan     Following recent traumatic fall  CT head appears stable           Low serum thyroid stimulating hormone (TSH)   Assessment & Plan     Low TSH with Low T4  Likely represent euthyroid-sick syndrome due to acute illness          Atrial fibrillation (HCC)   Assessment & Plan     Continue Coreg with BP holding parameter  Not on AC due to SDH          Diabetes mellitus type 2, diet-controlled (HCC)   Assessment & Plan     NSS          Goals of care, counseling/discussion   Assessment & Plan     I spoke with patient's friend(POA) [facility]  He was also updated earlier by ED   Pt is DNR/DNI  [facility] would like to consider possible hospice care   Consult hospice liaison for evaluation       Admission Orders:    BLOOD CULTURE X 2   LACTIC ACID X 2   CONSULT INFECTIOUS DISEASE       Scheduled Meds:   Current Facility-Administered Medications:  carvedilol 6 25 mg Oral BID With Meals   escitalopram 15 mg Oral Daily   insulin lispro 1-5 Units Subcutaneous TID AC   morphine injection 1 mg Intravenous Q4H PRN   multi-electrolyte 250 mL Intravenous Once   ondansetron 4 mg Intravenous Q6H PRN   piperacillin-tazobactam 2 25 g Intravenous Q6H   sodium chloride 50 mL/hr Intravenous Continuous   vancomycin 1,000 mg Intravenous Q24H     Continuous Infusions:   sodium chloride 50 mL/hr     PRN Meds: morphine injection    ondansetron

## 2018-05-10 NOTE — SOCIAL WORK
Met with pt's friend/POA Amber Mckinnon to discuss the role of CM and to discuss any help pt may need prior to dc  Pt was admitted from [facility] where pt was for 2 weeks receiving rehab  CM spoke to [facility] in admissions [facility] [place] who states pt is not a bedhold  [facility] states family is looking for long term and they are unable to provide those services--Per Tammie, they're unable to accept [place] at [place]  Amber Mckinnon states while at [facility] pt declined  Amber Mckinnon is requesting hospice services at this time--he prefers to speak with  hospice liaison  CM made [address]  Krist[facility] liaison aware of same  [facility] referral sent to -Hospice  Dr Amandeep Flannery made aware of same  Prior to admission to [address] was living alone in a 1st floor home with 2 KAI  Pt was independent and driving  Pt has a CPAP which is supplied through [facility]  No hx of D&A treatment  Pt has depression which is managed through PCP Dr Stefanie Boone  Copy of POA document made and placed on pt's chart      Contact: Amber Mckinnon (friend/POA) [phone]

## 2018-05-10 NOTE — H&P
H&P- Kyleigh Zendejas 1940, 68 y o  male MRN: [id number]    Unit/Bed#: [facility] 704-01 Encounter: [id number]    Primary Care Provider: Dontae Curtis   Date and time admitted to hospital: 5/9/2018  4:10 PM        * Severe sepsis [facility])   Assessment & Plan    Probable sepsis POA evidencd by fever, lactic acidosis and tachypnea  Source of infection not entirely clear  Likely aspiration pneumonia given history  Current chest x-ray negative  Will repeat chest x-ray in a m  after hydration  Follow-up cultures  Continue broad-spectrum coverage/IV fluid          Acute kidney injury [facility])   Assessment & Plan    Due to sepsis  Hold diuretics        SDH (subdural hematoma) ([facility])   Assessment & Plan    Following recent traumatic fall  CT head appears stable  Low serum thyroid stimulating hormone (TSH)   Assessment & Plan    Low TSH with Low T4  Likely represent euthyroid-sick syndrome due to acute illness        Atrial fibrillation ([facility] )   Assessment & Plan    Continue Coreg with BP holding parameter  Not on AC due to SDH        Diabetes mellitus type 2, diet-controlled ([facility] )   Assessment & Plan    NSS        Goals of care, counseling/discussion   Assessment & Plan    I spoke with patient's friend(POA) Hannah Rose  He was also updated earlier by ED   Pt is DNR/DNI  Hannah Rose would like to consider possible hospice care  Consult hospice liaison for evaluation              VTE Prophylaxis: Pharmacologic VTE Prophylaxis contraindicated due to SDH  /   Code Status: DNR  POLST: POLST form is not discussed and not completed at this time  Discussion with family: POA    Anticipated Length of Stay:  Patient will be admitted on an Inpatient basis with an anticipated length of stay of  > 2 midnights  Justification for Hospital Stay: Above    Total Time for Visit, including Counseling / Coordination of Care: 30 minutes  Greater than 50% of this total time spent on direct patient counseling and coordination of care      Chief Complaint:   Hypotension    History of Present Illness:    Diya Herrera is a 68 y o  male with recent SDH following traumatic fall, hypertension, hyperlipidemia, Parkinson's disease, CHF, AFib status post cardiac ablation, history of stroke who presents with hypotension  He was seen by Neurosurgery for follow up in the office when he was noted lethargic and hypotensive with blood pressure of 80/40  In the ED, he appeared pale with [place] coma scale of 9 to10  There was dried vomitus around his mouth noted  Riky Balint He has temp of a 100 9° with tachypnea  Review of Systems:    Review of Systems   Unable to perform ROS: Patient nonverbal       Past Medical and Surgical History:     Past Medical History:   Diagnosis Date    Arthritis     Cardiac disease     [facility]congestive heart failure) ([facility] )     Coronary artery disease     Diabetes mellitus [facility] )     Hyperlipidemia     Hypertension     Stroke ([facility] )     7 months ago       Past Surgical History:   Procedure Laterality Date    CARDIAC DEFIBRILLATOR PLACEMENT      with multiple revisions    CARDIAC ELECTROPHYSIOLOGY STUDY AND ABLATION      4/2018    CHOLECYSTECTOMY      CORONARY ANGIOPLASTY WITH STENT PLACEMENT      age 77    EYE SURGERY Bilateral     cataract    JOINT REPLACEMENT Right     knee x 10    LUMBAR SPINE SURGERY      unknown        Meds/Allergies:    Prior to Admission medications    Medication Sig Start Date End Date Taking?  Authorizing Provider   acetaminophen (TYLENOL) 325 mg tablet Take 2 tablets (650 mg total) by mouth every 6 (six) hours as needed for mild pain 4/27/18  Yes MARINE Ramirez   calcium carbonate-vitamin D (OSCAL-D) 500 mg-200 units per tablet Take 1 tablet by mouth 2 (two) times a day with meals   Yes Historical Provider, MD   carbidopa-levodopa (SINEMET)  mg per tablet Take 1 tablet by mouth 3 (three) times a day   Yes Historical Provider, MD   carvedilol (COREG) 6 25 mg tablet Take 6 25 mg by mouth 2 (two) times a day with meals   Yes Historical Provider, MD   escitalopram (LEXAPRO) 10 mg tablet Take 15 mg by mouth daily   Yes Historical Provider, MD   ezetimibe-simvastatin (VYTORIN) 10-20 mg per tablet Take 1 tablet by mouth daily at bedtime   Yes Historical Provider, MD   furosemide (LASIX) 40 mg tablet Take 40 mg by mouth daily Pt only takes when he gains more than two pounds a day     Yes Historical Provider, MD   nitroglycerin (NITROSTAT) 0 4 mg SL tablet Place 0 4 mg under the tongue every 5 (five) minutes as needed for chest pain   Yes Historical Provider, MD   senna-docusate sodium (SENOKOT S) 8 6-50 mg per tablet Take 1 tablet by mouth daily at bedtime 4/27/18  Yes MARINE Wallis   spironolactone (ALDACTONE) 25 mg tablet Take 25 mg by mouth daily   Yes Historical Provider, MD   levETIRAcetam (KEPPRA) 500 mg tablet Take 1 tablet (500 mg total) by mouth every 12 (twelve) hours for 7 days 4/27/18 5/4/18  MARINE Lorenzo   lisinopril (ZESTRIL) 2 5 mg tablet Take 2 5 mg by mouth daily  5/9/18  Historical Provider, MD   phytonadione (MEPHYTON) 5 mg tablet Take 1 tablet (5 mg total) by mouth daily 4/28/18 5/9/18  MARINE Lorenzo         Allergies:    Allergies   Allergen Reactions    Valium [Diazepam] Other (See Comments)     anger       Social History:     Marital Status: Single   Patient Pre-hospital Living Situation: NH    History   Alcohol Use    Yes     Comment: social     History   Smoking Status    Former Smoker   Smokeless Tobacco    Never Used     History   Drug Use No       Family History:    Family History   Problem Relation Age of Onset    Hypertension Father     Stroke Father     Stroke Brother        Physical Exam:     Vitals:   Blood Pressure: 100/53 (05/09/18 1957)  Pulse: 90 (05/09/18 1957)  Temperature: 99 7 °F (37 6 °C) (05/09/18 1957)  Temp Source: Rectal (05/09/18 1957)  Respirations: (!) 28 (05/09/18 1957)  Weight - Scale: 66 5 kg (146 lb 9 7 oz) (05/09/18 1950)  SpO2: 100 % (05/09/18 1957)    Physical Exam   Constitutional: No distress  Neck: Normal range of motion  Neck supple  Cardiovascular: Normal rate  Pulmonary/Chest: Effort normal  No respiratory distress  He has no wheezes  Abdominal: Soft  He exhibits no distension  There is no tenderness  Musculoskeletal: He exhibits no edema  Neurological:   Nonverbal  Eyes open to his name   Skin: Skin is warm and dry  He is not diaphoretic  Additional Data:     Lab Results:       Results from last 7 days  Lab Units 05/09/18  1631   WBC Thousand/uL 14 21*   HEMOGLOBIN g/dL 10 6*   HEMATOCRIT % 33 1*   PLATELETS Thousands/uL 194   LYMPHO PCT % 0*   MONO PCT MAN % 1*   EOSINO PCT MANUAL % 0       Results from last 7 days  Lab Units 05/09/18  1631   SODIUM mmol/L 144   POTASSIUM mmol/L 4 0   CHLORIDE mmol/L 104   CO2 mmol/L 27   BUN mg/dL 75*   CREATININE mg/dL 1 78*   CALCIUM mg/dL 9 7   TOTAL PROTEIN g/dL 5 8*   BILIRUBIN TOTAL mg/dL 2 88*   ALK PHOS U/L 115   ALT U/L <6*   AST U/L 32   GLUCOSE RANDOM mg/dL 157*       Results from last 7 days  Lab Units 05/09/18  1631   INR  1 91*               Imaging:    CT head without contrast   Final Result by Michael Morgan MD (05/09 1754)   1  Stable appearance of the bilateral subdural hematomas  2   Stable chronic microvascular imaging changes and chronic appearing bilateral infarcts  Workstation performed: [id number]         XR chest 1 view portable   Final Result by Patricia Nagel DO (05/09 1659)      No acute cardiopulmonary disease  Workstation performed: [id number]               ** Please Note: This note has been constructed using a voice recognition system   **

## 2018-05-10 NOTE — PROGRESS NOTES
Progress Note - Madie Iha 1940, 68 y o  male MRN: [id number]    Unit/Bed#: [facility] 704-01 Encounter: [id number]    Primary Care Provider: Dorene Ramirez   Date and time admitted to hospital: 5/9/2018  4:10 PM        Low serum thyroid stimulating hormone (TSH)   Assessment & Plan    Low TSH with Low T4  Likely represent euthyroid-sick syndrome due to acute illness        Goals of care, counseling/discussion   Assessment & Plan    I spoke with patient's friend(POA) Darline Lei  He was also updated earlier by ED   Pt is DNR/DNI  Darline Lei would like to consider possible hospice care  Consult hospice liaison for evaluation        Acute kidney injury [facility])   Assessment & Plan    Due to sepsis  Hold diuretics        Malnutrition ([facility] [address] )   Assessment & Plan    Malnutrition Findings:           BMI Findings: Body mass index is 19 34 kg/m²  Diabetes mellitus type 2, diet-controlled ([facility] )   Assessment & Plan    NSS        Atrial fibrillation (HCC)   Assessment & Plan    Continue Coreg with BP holding parameter  Not on AC due to SDH        SDH (subdural hematoma) ([facility])   Assessment & Plan    Following recent traumatic fall  CT head appears stable  * Severe sepsis ([facility] )   Assessment & Plan    Probable sepsis POA evidencd by fever, lactic acidosis and tachypnea  Source of infection not entirely clear  Likely aspiration pneumonia given history  Current chest x-ray negative  Will repeat chest x-ray in a m  after hydration  Will get procalcitonin levels  Urinalysis at this point would not be a bad idea  Follow-up cultures  Leukocytosis not high  Continue broad-spectrum coverage/IV fluid                 VTE Pharmacologic Prophylaxis:   Pharmacologic: Pharmacologic VTE Prophylaxis contraindicated due to dvt comfort care  Mechanical VTE Prophylaxis in Place: Yes    Patient Centered Rounds: I have performed bedside rounds with nursing staff today      Discussions with Specialists or Other Care Team Provider: palliative care    Education and Discussions with Family / Patient: patient    Time Spent for Care: 45 minutes  More than 50% of total time spent on counseling and coordination of care as described above  Current Length of Stay: 1 day(s)    Current Patient Status: Inpatient   Certification Statement: The patient will continue to require additional inpatient hospital stay due to needs to go to hospice    Discharge Plan: hospice     Code Status: Level 3 - DNAR and DNI      Subjective:   Patient is comfortable  Objective:     Vitals:   Temp (24hrs), Av °F (37 2 °C), Min:97 7 °F (36 5 °C), Max:100 9 °F (38 3 °C)    HR:  [] 91  Resp:  [20-48] 20  BP: ()/(40-59) 105/58  SpO2:  [90 %-100 %] 94 %  Body mass index is 19 34 kg/m²  Input and Output Summary (last 24 hours): Intake/Output Summary (Last 24 hours) at 05/10/18 0841  Last data filed at 05/10/18 0541   Gross per 24 hour   Intake               50 ml   Output              845 ml   Net             -795 ml       Physical Exam:     Physical Exam   Constitutional: He is oriented to person, place, and time  He appears well-developed and well-nourished  HENT:   Head: Normocephalic and atraumatic  Right Ear: External ear normal    Left Ear: External ear normal    Nose: Nose normal    Mouth/Throat: Oropharynx is clear and moist  No oropharyngeal exudate  Eyes: Conjunctivae and EOM are normal  Pupils are equal, round, and reactive to light  Right eye exhibits no discharge  Left eye exhibits no discharge  No scleral icterus  Neck: Normal range of motion  Neck supple  No JVD present  No tracheal deviation present  No thyromegaly present  Cardiovascular: Normal rate, regular rhythm, normal heart sounds and intact distal pulses  Exam reveals no gallop and no friction rub  No murmur heard  Pulmonary/Chest: Effort normal and breath sounds normal  No stridor  No respiratory distress  He has no wheezes  He has no rales   He exhibits no tenderness  Abdominal: Soft  Bowel sounds are normal  He exhibits no distension and no mass  There is no tenderness  There is no rebound and no guarding  Musculoskeletal: Normal range of motion  He exhibits no edema, tenderness or deformity  Lymphadenopathy:     He has no cervical adenopathy  Neurological: He is alert and oriented to person, place, and time  He displays normal reflexes  No cranial nerve deficit  He exhibits normal muscle tone  Coordination normal    Skin: Skin is warm and dry  No rash noted  No erythema  No pallor  Psychiatric: He has a normal mood and affect  His behavior is normal  Judgment and thought content normal    Nursing note and vitals reviewed  Additional Data:     Labs:      Results from last 7 days  Lab Units 05/10/18  0507 05/09/18  1631   WBC Thousand/uL 12 93* 14 21*   HEMOGLOBIN g/dL 9 4* 10 6*   HEMATOCRIT % 30 1* 33 1*   PLATELETS Thousands/uL 160 194   LYMPHO PCT %  --  0*   MONO PCT MAN %  --  1*   EOSINO PCT MANUAL %  --  0       Results from last 7 days  Lab Units 05/10/18  0507 05/09/18  1631   SODIUM mmol/L 147* 144   POTASSIUM mmol/L 3 6 4 0   CHLORIDE mmol/L 112* 104   CO2 mmol/L 29 27   BUN mg/dL 69* 75*   CREATININE mg/dL 1 31* 1 78*   CALCIUM mg/dL 8 8 9 7   TOTAL PROTEIN g/dL  --  5 8*   BILIRUBIN TOTAL mg/dL  --  2 88*   ALK PHOS U/L  --  115   ALT U/L  --  <6*   AST U/L  --  32   GLUCOSE RANDOM mg/dL 120 157*       Results from last 7 days  Lab Units 05/09/18  1631   INR  1 91*       * I Have Reviewed All Lab Data Listed Above  * Additional Pertinent Lab Tests Reviewed:  Lex[facility] Admission Reviewed    Imaging:    Imaging Reports Reviewed Today Include:    Imaging Personally Reviewed by Myself Includes:       Recent Cultures (last 7 days):           Last 24 Hours Medication List:     Current Facility-Administered Medications:  carvedilol 6 25 mg Oral BID With Meals Rich Scott MD    escitalopram 15 mg Oral Daily Rich Scott MD insulin lispro 1-5 Units Subcutaneous TID AC Omar Gillis MD    morphine injection 1 mg Intravenous Q4H PRN Omar Gillis MD    multi-electrolyte 250 mL Intravenous Once Anusha Pate MD    ondansetron 4 mg Intravenous Q6H PRN Omar Gillis MD    piperacillin-tazobactam 2 25 g Intravenous Q6H Unique Guerrero PA-C Last Rate: Stopped (05/10/18 5112)   sodium chloride 50 mL/hr Intravenous Continuous Unique Guerrero PA-C Last Rate: 50 mL/hr (05/09/18 0127)   vancomycin 1,000 mg Intravenous Q24H Unique Guerrero PA-C         Today, Patient Was Seen By: Minal Lincoln MD    ** Please Note: Dictation voice to text software may have been used in the creation of this document   **

## 2018-05-10 NOTE — RESTORATIVE TECHNICIAN NOTE
Restorative Specialist Mobility Note                      Repositioned: Other (Comment) (Rep /sat pt upright in bed  Bed alarm on   Pt callbell, phone/tray within reach )       Rony KEARNS, Restorative Technician, [employer]

## 2018-05-10 NOTE — SOCIAL WORK
MCG Guide Used for Initial Round: Sepsis and Other Febrile Illness, without Focal Infection RRG  Optimal GLOS: 3  Hospital Day: 1 day  DC Readiness:   Discharge Readiness  Return to top of Sepsis and Other Febrile Illness, without Focal Infection RRG - ISC  · Discharge readiness is indicated by patient meeting Recovery Milestones, including ALL of the following:  ? Hemodynamic stability  ? Fever absent or reduced  ? Hypoxemia absent  ? Mental status at baseline  ? End-organ dysfunction (eg, myocardial ischemia, renal failure) absent  ? Metabolic derangement (eg, dehydration, acidosis) absent  ? Cultures negative or infection identified and under adequate treatment  ? Ambulatory  ? Oral hydration, medications[L]  ? Discharge plans and education understood    Identified Barriers: monitor temperature  Cxray pend  Sepsis work up in progress    Discussion Date (Time): 05/10/18 with Dr Anuradha Sandra

## 2018-05-10 NOTE — CONSULTS
Consult Note - Wound   Aminah Davidson 68 y o  male MRN: [id number]  Unit/Bed#: [facility] 704-01 Encounter: [id number]      Assessment:   Patient is a 68year old male send from his neurosurgeon's office after noticing patient being lethargic, hypotensive with low grade fever  Patient also noted with dried vomit around his mouth, he recently had SDH after traumatic fall  See for possible stage 2 pressure injuries to buttocks  Findings:  1- Bilateral perirectal area and midline sacrum with macerated, hyperemic but blanchable denude skin area likely secondary to MASD  Buttocks and heels noted intact with no skin breakdown, scabbed abrasion to L elbow as well as lateral aspect of L foot of unknown etiology, not pressure  Patient is incontinent, non verbal, with no self mobility or attempts of, he appears cachetic with generalize dry, pale skin with current Yamil score of 12  Plan:   1-Cleanse bilateral sacrum, perineum with soap and water, dust area with stoma powder then apply thin layer of calazime TID and PRN with incontinence care  2-Turn/reposition q2h for pressure re-distribution on skin  3-Soft care cushion when out of bed  4-Elevate heels to offload pressure  5-Moisturize skin daily with skin nourishing cream   6-Hydraguard to bilateral heels TID and PRN  Vitals: Blood pressure 105/58, pulse 91, temperature 97 7 °F (36 5 °C), temperature source Axillary, resp  rate 20, height 6' 1" (1 854 m), weight 66 5 kg (146 lb 9 7 oz), SpO2 94 %  ,Body mass index is 19 34 kg/m²      Wound 05/10/18 Moisture associated skin damage Rectum Right;Left Perirectum and midline sacrum with MASD related skin breakdown (Active)   Wound Description Pink 5/10/2018  9:00 AM   Shey-wound Assessment Maceration 5/10/2018  9:00 AM   Wound Length (cm) 9 cm 5/10/2018  9:00 AM   Wound Width (cm) 5 cm 5/10/2018  9:00 AM   Wound Depth (cm) 0 5/10/2018  9:00 AM   Calculated Wound Volume (cm^3) 0 cm^3 5/10/2018  9:00 AM   Tunneling 0 cm 5/10/2018  9:00 AM   Undermining 0 5/10/2018  9:00 AM   Drainage Amount None 5/10/2018  9:00 AM   Non-staged Wound Description Partial thickness 5/10/2018  9:00 AM   Treatments Cleansed;Site care 5/10/2018  9:00 AM   Dressing Protective barrier 5/10/2018  9:00 AM   Patient Tolerance Tolerated well 5/10/2018  9:00 AM       Our recommendations are placed as orders, primary RN Annabella Darinel notified of findings and recommendations  Wound care is signing off, please call with any questions, concerns or if MASD wounds not improving to ext [phone] or [phone]      Dottie Marquis, RN, BSN, Stephania Mathew

## 2018-05-10 NOTE — CONSULTS
Consultation - Palliative Care   Rodolfo Albright 68 y o  male MRN: [id number]  Unit/Bed#: [facility] 704-01 Encounter: [id number]      Assessment/Plan     Assessment:  Patient Active Problem List   Diagnosis    Hypotension    SDH (subdural hematoma) ([place] [address] )    Intraparenchymal hematoma of brain (HCC)    Atrial fibrillation ([place] [address] )    Parkinson disease ([place] [address] )    Diabetes mellitus type 2, diet-controlled ([place] [address] )    CAD (coronary artery disease)    History of implantable cardioverter-defibrillator (ICD) placement    CHF (congestive heart failure) ([place] [address] )    Fall    MCI (mild cognitive impairment)    Ambulatory dysfunction    Malnutrition ([place] [address] )    Physical deconditioning    Hyperbilirubinemia    Elevated serum creatinine    Disorientation    Tachypnea    Severe sepsis (HCC)    Acute kidney injury ([place] [address] )    Goals of care, counseling/discussion    Low serum thyroid stimulating hormone (TSH)     Plan:  Goals of care: Spoke with Daisy Huffman over phone  He would like to focus only on patient's comfort and transition to hospice care  He does not want patient to receive antibiotics or any life-prolonging measures  Will change to level 4 comfort care  Consult has been placed to hospice liaison  Symptom management: dyspnea, encephalopathy; unable to take PO  - morphine 2mg IV q 1 hr PRN pain or dyspnea  - will avoid haloperidol given history of Parkinson's disease  - history of allergy to diazepam; will avoid lorazepam for now  Please contact palliative care on-call if patient having uncontrolled symptoms  History of Present Illness   Physician Requesting Consult: Barbie Price MD  Reason for Consult / Principal Problem: goals of care  Hx and PE limited by: encephalopathy  HPI: Rodolfo Albright is a 68 y o  male who presents with sepsis possibly related to pneumonia  Was hypotensive and symptomatic in neurosurgery office during follow up appointment  Sent to ER     Recent hospitalization 4/24- 4/27 for SDH after fall  No surgical interventions  [facility] recalls that patient has had significant decline since going to [facility] after recent hospitalization  He has stopped participating or eating  Prior to fall, patient had been independent and lived alone  Carrie Wheelerbrownrafa is POA and main contact      Inpatient consult to Palliative Care  Consult performed by: Sonia Bazzi  Consult ordered by: Delia Goodwin          Review of Systems   Unable to perform ROS: Mental status change       Historical Information   Past Medical History:   Diagnosis Date    Arthritis     Cardiac disease     [facility]est[facility]) [facility] [address] [facility]     Coronary artery disease     [facility]     Hyperlipidemia     Hypertension     Stroke ([facility] )     7 months ago     Past Surgical History:   Procedure Laterality Date    CARDIAC DEFIBRILLATOR PLACEMENT      with multiple revisions    CARDIAC ELECTROPHYSIOLOGY STUDY AND ABLATION      4/2018    CHOLECYSTECTOMY      CORONARY ANGIOPLASTY WITH STENT PLACEMENT      age 77    EYE SURGERY Bilateral     cataract    JOINT REPLACEMENT Right     knee x 10    LUMBAR SPINE SURGERY      unknown      Social History     Social History    Marital status: Single     Spouse name: N/A    Number of children: N/A    Years of education: N/A     Social History Main Topics    Smoking status: Former Smoker    Smokeless tobacco: Never Used    Alcohol use No    Drug use: No    Sexual activity: No     Other Topics Concern    None     Social History Narrative    None     Family History   Problem Relation Age of Onset    Hypertension Father     Stroke Father     Stroke Brother        Meds/Allergies   all current active meds have been reviewed and current meds:   Current Facility-Administered Medications   Medication Dose Route Frequency    carvedilol (COREG) tablet 6 25 mg  6 25 mg Oral BID With Meals    escitalopram (LEXAPRO) tablet 15 mg  15 mg Oral Daily    morphine injection 2 mg  2 mg Intravenous Q1H PRN    ondansetron (ZOFRAN) injection 4 mg  4 mg Intravenous Q6H PRN         Allergies   Allergen Reactions    Valium [Diazepam] Other (See Comments)     anger       Objective     Physical Exam   Constitutional: He appears lethargic  He appears cachectic  Frail, elderly   HENT:   Head: Normocephalic and atraumatic  Right Ear: External ear normal    Left Ear: External ear normal    Mouth/Throat: Mucous membranes are dry  Eyes: Conjunctivae and lids are normal    Cardiovascular: Tachycardia present  Pulmonary/Chest:   Mild dyspnea   Neurological: He appears lethargic  Minimal eye opening to voice; no verbal response to questions    Skin: Skin is warm and dry  There is pallor  Psychiatric: He is withdrawn  Cognition and memory are impaired  Lab Results: I have personally reviewed pertinent labs  Imaging Studies: I have personally reviewed pertinent reports  EKG, Pathology, and Other Studies: I have personally reviewed pertinent reports        Code Status: Level 4 - Comfort Care  Advance Directive and Living Will:      Power of :  friend Cristhian Del Valle, document on paper chart, to be scanned into Epic  POLST:

## 2018-05-10 NOTE — DISCHARGE INSTR - OTHER ORDERS
Skin Care Plan:   1-Cleanse bilateral sacrum, perineum with soap and water, dust area with stoma powder then apply thin layer of calazime TID and PRN with incontinence care  2-Turn/reposition q2h for pressure re-distribution on skin  3-Soft care cushion when out of bed  4-Elevate heels to offload pressure  5-Moisturize skin daily with skin nourishing cream   6-Hydraguard to bilateral heels TID and PRN

## 2018-05-10 NOTE — PLAN OF CARE
Problem: DISCHARGE PLANNING - CARE MANAGEMENT  Goal: Discharge to post-acute care or home with appropriate resources  INTERVENTIONS:  - Conduct assessment to determine patient/family and health care team treatment goals, and need for post-acute services based on payer coverage, community resources, and patient preferences, and barriers to discharge  - Address psychosocial, clinical, and financial barriers to discharge as identified in assessment in conjunction with the patient/family and health care team  - Arrange appropriate level of post-acute services according to patient's   needs and preference and payer coverage in collaboration with the physician and health care team  - Communicate with and update the patient/family, physician, and health care team regarding progress on the discharge plan  - Arrange appropriate transportation to post-acute venues  - Plan for discharge to rehab vs hospice    Outcome: Progressing

## 2018-05-10 NOTE — ASSESSMENT & PLAN NOTE
I spoke with patient's friend(POA) Jonathan Faust  He was also updated earlier by ED   Pt is DNR/DNI  Jonathan Faust would like to consider possible hospice care   Consult hospice liaison for evaluation

## 2018-05-11 PROBLEM — Z51.5 END OF LIFE CARE: Status: ACTIVE | Noted: 2018-01-01

## 2018-05-11 PROBLEM — I48.91 ATRIAL FIBRILLATION (HCC): Status: RESOLVED | Noted: 2018-01-01 | Resolved: 2018-01-01

## 2018-05-11 PROBLEM — N17.9 AKI (ACUTE KIDNEY INJURY) (HCC): Status: RESOLVED | Noted: 2018-01-01 | Resolved: 2018-01-01

## 2018-05-11 PROBLEM — E43 SEVERE PROTEIN-CALORIE MALNUTRITION (HCC): Status: ACTIVE | Noted: 2018-01-01

## 2018-05-11 NOTE — PLAN OF CARE
DISCHARGE PLANNING - CARE MANAGEMENT     Discharge to post-acute care or home with appropriate resources Progressing          INFECTION - ADULT     Absence or prevention of progression during hospitalization Progressing     Absence of fever/infection during neutropenic period Progressing          Potential for Falls     Patient will remain free of falls Progressing          Prexisting or High Potential for Compromised Skin Integrity     Skin integrity is maintained or improved Progressing          SAFETY ADULT     Maintain or return to baseline ADL function Progressing     Maintain or return mobility status to optimal level Progressing

## 2018-05-11 NOTE — HOSPICE NOTE
Received consents from [facility]  KATELYNN has copies of consents and DNR for pt to go to inpatient hospice tomorrow at 130pm  Nurse to please call report on Saturday prior to transport at [phone]  KATELYNN Marie set up transport with [facility] ambulance  Hospice house aware of patient  Fax for [facility] is [phone] if needed

## 2018-05-11 NOTE — ASSESSMENT & PLAN NOTE
Malnutrition Findings:   Malnutrition type: Chronic illness  Degree of Malnutrition: Other severe protein calorie malnutrition (with <75% energy intake versus needs for > 1 month resulting in 10# (6%) wt loss exhibiting fat depletion with hollow orbital look and muscle loss with clavicle protruding  )    BMI Findings: Body mass index is 19 34 kg/m²

## 2018-05-11 NOTE — PROGRESS NOTES
05/11/18 1030   Plan of Care   Comments  visited with pt, but pt did not responded  Assessment Completed by:  Other (Comment)  (Dept Referral)

## 2018-05-11 NOTE — SOCIAL WORK
CM was informed by [facility] liaison that consents were received from pts [address]  Pt is approved for IP hospice--bed available tomorrow  CM arranged with [address] [place] [address] Rd for a 1:30pm dc to [facility]  CM notified pts bedside RN Sugar Renteria, pts [address], Dr Amandeep Flannery, and Jeanine hospice liaison of dc time  Facility transfer form and CMN completed  Chart copy requested  Out of hospital DNR placed on chart copy for EMS  CM called [facility] and spoke to Suellen Kayser in provider services who states precert is required  CM was transfered to authorization department and spoke to [facility] who states for a facility to facility transport Prashanth Keny is not required  CM notified Kourtney Romero at [address] [place] [address] Rd of same

## 2018-05-11 NOTE — PROGRESS NOTES
Progress Note - Catherine Andrea 1940, 68 y o  male MRN: [id number]    Unit/Bed#: [facility] 704-01 Encounter: [id number]    Primary Care Provider: Anastasia Zambrano   Date and time admitted to hospital: 5/9/2018  4:10 PM        End of life care   Assessment & Plan    Patient is awaiting to go to hospice house tomorrow afternoon        Low serum thyroid stimulating hormone (TSH)   Assessment & Plan    Low TSH with Low T4  Likely represent euthyroid-sick syndrome due to acute illness        Goals of care, counseling/discussion   Assessment & Plan    I spoke with patient's friend(POA) Ward Mick  He has made him comfort care only for now  Malnutrition ([address] )   Assessment & Plan    Malnutrition Findings:   Malnutrition type: Chronic illness  Degree of Malnutrition: Other severe protein calorie malnutrition (with <75% energy intake versus needs for > 1 month resulting in 10# (6%) wt loss exhibiting fat depletion with hollow orbital look and muscle loss with clavicle protruding  )    BMI Findings: Body mass index is 19 34 kg/m²  SDH [facility]subdural hematoma) ([facility])   Assessment & Plan    Comfort care level        * Severe sepsis [facility])   Assessment & Plan    Probable sepsis POA evidencd by fever, lactic acidosis and tachypnea  Source of infection not entirely clear  Likely aspiration pneumonia given history  Current chest x-ray negative  Will repeat chest x-ray in a m  after hydration  Will get procalcitonin levels  Urinalysis at this point would not be a bad idea  Follow-up cultures  Leukocytosis not high  Continue broad-spectrum coverage/IV fluid  POA did not want to continue with pursuing, he is made comfort care                VTE Pharmacologic Prophylaxis:   Pharmacologic: Pharmacologic VTE Prophylaxis contraindicated due to end of life care  Mechanical VTE Prophylaxis in Place: Yes    Patient Centered Rounds: I have performed bedside rounds with nursing staff today      Discussions with Specialists or Other Care Team Provider: medicine    Education and Discussions with Family / Patient: patient    Time Spent for Care: 45 minutes  More than 50% of total time spent on counseling and coordination of care as described above  Current Length of Stay: 2 day(s)    Current Patient Status: Inpatient   Certification Statement: The patient will continue to require additional inpatient hospital stay due to end of life medical care    Discharge Plan: hospice tomorrow      Code Status: Level 4 - Comfort Care      Subjective:   Patient is lethergic and looks comfortable  Objective:     Vitals:   Temp (24hrs), Av 3 °F (36 8 °C), Min:97 5 °F (36 4 °C), Max:99 °F (37 2 °C)    HR:  [104-105] 105  Resp:  [22] 22  BP: (114-138)/(63-64) 138/64  SpO2:  [96 %-97 %] 96 %  Body mass index is 19 34 kg/m²  Input and Output Summary (last 24 hours): Intake/Output Summary (Last 24 hours) at 18 1701  Last data filed at 18 1601   Gross per 24 hour   Intake                0 ml   Output             1420 ml   Net            -1420 ml       Physical Exam:     Physical Exam   Constitutional: He is oriented to person, place, and time  He appears well-developed and well-nourished  HENT:   Head: Normocephalic and atraumatic  Right Ear: External ear normal    Left Ear: External ear normal    Nose: Nose normal    Eyes: Conjunctivae are normal  Pupils are equal, round, and reactive to light  Right eye exhibits no discharge  Left eye exhibits no discharge  No scleral icterus  Neck: Normal range of motion  Neck supple  No JVD present  No tracheal deviation present  No thyromegaly present  Cardiovascular: Normal rate, regular rhythm, normal heart sounds and intact distal pulses  Exam reveals no gallop and no friction rub  No murmur heard  Pulmonary/Chest: Effort normal  Stridor present  No respiratory distress  He has no wheezes  He has no rales  He exhibits no tenderness  Abdominal: Soft   Bowel sounds are normal  He exhibits no distension and no mass  There is no tenderness  There is no rebound and no guarding  Musculoskeletal: Normal range of motion  He exhibits no edema, tenderness or deformity  Lymphadenopathy:     He has no cervical adenopathy  Neurological: He is alert and oriented to person, place, and time  He has normal reflexes  He displays normal reflexes  No cranial nerve deficit  He exhibits normal muscle tone  Coordination normal    Skin: Skin is warm and dry  No rash noted  No erythema  No pallor  Psychiatric: He has a normal mood and affect  His behavior is normal  Judgment and thought content normal    Nursing note and vitals reviewed  Additional Data:     Labs:      Results from last 7 days  Lab Units 05/10/18  0507 05/09/18  1631   WBC Thousand/uL 12 93* 14 21*   HEMOGLOBIN g/dL 9 4* 10 6*   HEMATOCRIT % 30 1* 33 1*   PLATELETS Thousands/uL 160 194   LYMPHO PCT %  --  0*   MONO PCT MAN %  --  1*   EOSINO PCT MANUAL %  --  0       Results from last 7 days  Lab Units 05/10/18  0507 05/09/18  1631   SODIUM mmol/L 147* 144   POTASSIUM mmol/L 3 6 4 0   CHLORIDE mmol/L 112* 104   CO2 mmol/L 29 27   BUN mg/dL 69* 75*   CREATININE mg/dL 1 31* 1 78*   CALCIUM mg/dL 8 8 9 7   TOTAL PROTEIN g/dL  --  5 8*   BILIRUBIN TOTAL mg/dL  --  2 88*   ALK PHOS U/L  --  115   ALT U/L  --  <6*   AST U/L  --  32   GLUCOSE RANDOM mg/dL 120 157*       Results from last 7 days  Lab Units 05/09/18  1631   INR  1 91*       * I Have Reviewed All Lab Data Listed Above  * Additional Pertinent Lab Tests Reviewed: Lex[address] Admission Reviewed    Imaging:    Imaging Reports Reviewed Today Include:    Imaging Personally Reviewed by Myself Includes:       Recent Cultures (last 7 days):       Results from last 7 days  Lab Units 05/09/18  1631   BLOOD CULTURE  No Growth at 24 hrs  No Growth at 24 hrs         Last 24 Hours Medication List:     Current Facility-Administered Medications:  morphine injection 4 mg Intravenous Q1H PRN Paula Winn DO   ondansetron 4 mg Intravenous Q6H PRN Yoni Pepe MD   PHENobarbital 30 mg Intravenous Q6H PRN John Gannon DO        Today, Patient Was Seen By: Laureen Sylvester MD    ** Please Note: Dictation voice to text software may have been used in the creation of this document   **

## 2018-05-11 NOTE — ASSESSMENT & PLAN NOTE
- Patient no longer safe to take PO- d/c oral medications  - increase IV morphine to 4 mg q1H PRN  - patient cannot use Haldol given diagnosis of Parkinson's disease and has a documented allergy to benzodiazepines- thus, will start IV Phenobarbital 30 mg q6H PRN agitaton  - hospice placement pending, in current state he qualifies for IPU but unsure of bed availability at this time

## 2018-05-11 NOTE — RESTORATIVE TECHNICIAN NOTE
Restorative Specialist Mobility Note                      Repositioned: Other (Comment) (Rep /sat pt upright in bed  Bed alarm on   Pt callbell, phone/tray within reach )       Adan KEARNS, Restorative Technician, [employer]

## 2018-05-11 NOTE — HOSPICE NOTE
Received call from [facility] friend at 210pm, consents emailed to him, he states he will have his daughter assist him with signing and then will scan and email back  Plan is for tentative transport tomorrow to inpatient unit if consents received back  KATELYNN serna

## 2018-05-11 NOTE — SPEECH THERAPY NOTE
Speech Language/Pathology  Speech/Language Pathology  Assessment    Patient Name: Rodolfo Albright  ANSTJ'D Date: 5/11/2018     rec'd consult for speech/swallowing evaluation  Pt s/p SDH due to traumatic fall  POA has elected for hospice services, palliative care was consulted  Pt now a level 4 comfort care  Will sign off, reconsult if needed

## 2018-05-11 NOTE — PROGRESS NOTES
Progress Note - Palliative & Supportive Care       Progress Note - Omi Infante 1940, 68 y o  male MRN: [id number]    Unit/Bed#: [facility] 704-01 Encounter: [id number]    Primary Care Provider: Adama Martinez   Date and time admitted to hospital: 5/9/2018  4:10 PM    Assessment:      Patient Active Problem List   Diagnosis    Hypotension    [facility]    Intraparenchymal hematoma of brain ([place] [address] )    Atrial fibrillation ([place] [address] )    Parkinson disease ([place] [address] )    Diabetes mellitus type 2, diet-controlled ([place] [address] )    CAD (coronary artery disease)    History of implantable cardioverter-defibrillator (ICD) placement    CH[facility]congestive [facility] failure) [facility]  )    Fall    MCI (mild cognitive impairment)    Ambulatory dysfunction    Malnutrition ([place] [address] )    Physical deconditioning    Hyperbilirubinemia    Elevated serum creatinine    Disorientation    Tachypnea    Severe sepsis (HCC)    Acute kidney injury ([place] [address] )    Goals of care, counseling/discussion    Low serum thyroid stimulating hormone (TSH)       End of life care   Assessment & Plan    - Patient no longer safe to take PO- d/c oral medications  - increase IV morphine to 4 mg q1H PRN  - patient cannot use Haldol given diagnosis of Parkinson's disease and has a documented allergy to benzodiazepines- thus, will start IV Phenobarbital 30 mg q6H PRN agitaton  - hospice placement pending, in current state he qualifies for IPU but unsure of bed availability at this time            Interval history:       Patient appears uncomfortable  Received morphine with no real improvement in symptoms  Not safe to take PO given mental status       MEDICATIONS / ALLERGIES:    all current active meds have been reviewed and current meds:   Current Facility-Administered Medications   Medication Dose Route Frequency    morphine (PF) 4 mg/mL injection 4 mg  4 mg Intravenous Q1H PRN    ondansetron (ZOFRAN) injection 4 mg  4 mg Intravenous Q6H PRN    PHENobarbital 65 mg/mL injection 30 mg  30 mg Intravenous Q6H PRN       Allergies   Allergen Reactions    Valium [Diazepam] Other (See Comments)     anger       OBJECTIVE:    Physical Exam  Physical Exam   Constitutional: He appears distressed  Frail and ill appearing   HENT:   Head: Normocephalic and atraumatic  Eyes: Right eye exhibits no discharge  Left eye exhibits no discharge  Cardiovascular: Normal rate, regular rhythm and intact distal pulses  No murmur heard  Pulmonary/Chest: Effort normal  He has rales  Abdominal: Soft  He exhibits no distension  There is no tenderness  Musculoskeletal: He exhibits no edema  Neurological:   Opens eyes to voice, grimacing with pain    Skin: Skin is warm and dry  There is pallor  Nursing note and vitals reviewed  Lab Results: I have personally reviewed pertinent labs  , CBC: No results found for: WBC, HGB, HCT, MCV, PLT, ADJUSTEDWBC, MCH, MCHC, RDW, MPV, NRBC, CMP: No results found for: NA, K, CL, CO2, ANIONGAP, BUN, CREATININE, GLUCOSE, CALCIUM, AST, ALT, ALKPHOS, PROT, ALBUMIN, BILITOT, EGFR  Imaging Studies: no new   EKG, Pathology, and Other Studies: no new    Counseling / Coordination of Care  Total floor / unit time spent today 25+ minutes  Greater than 50% of total time was spent with the patient and / or family counseling and / or coordination of care   A description of the counseling / coordination of care: symptom assessment, medication changes, end of life care

## 2018-05-11 NOTE — SOCIAL WORK
CM was informed by [facility] hospice liaison that pt is approved for  hospice house  Alexsander Gomez left message for POA to discuss meeting and signing consents

## 2018-05-12 NOTE — PROGRESS NOTES
PRONOUNCEMENT OF DEATH     DOA: 2018    DOD: 2018    TOD: 2323    CODE STATUS AT TOD: Level 4 Comfort Care    PATIENT ON HOSPICE?: Yes    FAMILY NOTIFIED?: Yes  ALBERTINA Aguiar    AUTOPSY DESIRED?: No    SUSPECTED COD: Severe sepsis likely 2/2 aspiration PNA  Malnutrition w/ deconditioning    HOSPITAL PROBLEM LIST:   Patient Active Problem List   Diagnosis    Hypotension    [facility])    Intraparenchymal hematoma of brain ([facility])    Parkinson disease [facility]    Diabetes mellitus type 2, diet-controlled [facility]    CAD (coronary artery disease)    History of implantable cardioverter-defibrillator (ICD) placement    CHF (congestive heart failure) ([facility] )    Fall    MCI (mild cognitive impairment)    Ambulatory dysfunction    Malnutrition ([facility])    Physical deconditioning    Hyperbilirubinemia    Elevated serum creatinine    Disorientation    Tachypnea    Severe sepsis ([facility])    Goals of care, counseling/discussion    Low serum thyroid stimulating hormone (TSH)    End of life care    Severe protein-calorie malnutrition ([facility] )       PRONOUNCEMENT OF DEATH    I was contacted by nursing staff to evaluate the patient found without vital signs  Patient was identified visually and identification was confirmed by hospital ID gustabo  Patient was found laying in bed with no family at bedside  Personally examined the patient without heart sounds auscultated on exam nor were pulses detected x 2  No breath sounds were appreciated after 2 minutes of auscultation  Pupils were fixed and non-reactive to light  Patient was pronounced dead at this date and time   home: Elizabeth Mckinney will call in am with  home information    Please kindly review hospital chart for all details of this hospitalization and circumstances leading to death  Death certificate will be signed my attending physician at a later time

## 2018-05-12 NOTE — PROGRESS NOTES
Patient   Félixnyn with [facility] pronounced  @ 2323  Called [phone] patient does not qualify  Patients friend will call tomorrow with the  home information  Called hospice house to advise of patients passing  They are aware he will not be arriving tomorrow  Spoke with Dr Georgia Alves and she will advise Dr Anat Sauer admitting n the morning of the patients death

## 2018-05-12 NOTE — PROGRESS NOTES
Sadie released patient to be taken down to the [facility]  Patients death certificate will be handled by the corner

## 2018-05-12 NOTE — PROGRESS NOTES
[address] [place] [address] and they are a corners case due to the tramatic fall back in April  Tim Blanchard is the corner on call and will be here to evaluate the patient

## 2018-05-14 LAB
BACTERIA BLD CULT: NORMAL
BACTERIA BLD CULT: NORMAL

## 2018-05-15 NOTE — ASSESSMENT & PLAN NOTE
I spoke with patient's friend(POA) Christel Schwartz    Palliative care team has discussed regarding patient current condition and per patient's wishes if he is ever in the situation to stop every thing and,   Was made him comfort care per palliative care team      He was scheduled to go hospice care when patient passed away

## 2018-05-15 NOTE — DISCHARGE SUMMARY
Discharge- [facility] 1940, 68 y o  male MRN: [id number]    Unit/Bed#: [facility]PHP 704-01 Encounter: [id number]    Primary Care Provider: Titi Chan   Date and time admitted to hospital: 5/9/2018  4:10 PM        Severe protein-calorie malnutrition ([facility] [address] )   Assessment & Plan    Malnutrition Findings:   Malnutrition type: Chronic illness  Degree of Malnutrition: Other severe protein calorie malnutrition (with <75% energy intake versus needs for > 1 month resulting in 10# (6%) wt loss exhibiting fat depletion with hollow orbital look and muscle loss with clavicle protruding  )    BMI Findings: Body mass index is 19 34 kg/m²  Low serum thyroid stimulating hormone (TSH)   Assessment & Plan    Low TSH with Low T4  Likely represent euthyroid-sick syndrome due to acute illness, will need repeat after 4-6 weeks        Goals of care, counseling/discussion   Assessment & Plan    I spoke with patient's friend(POA) Mena Tripathi  Palliative care team has discussed regarding patient current condition and per patient's wishes if he is ever in the situation to stop every thing and,   Was made him comfort care per palliative care team      He was scheduled to go hospice care when patient passed away        Malnutrition [facility])   Assessment & Plan    Malnutrition Findings:   Malnutrition type: Chronic illness  Degree of Malnutrition: Other severe protein calorie malnutrition (with <75% energy intake versus needs for > 1 month resulting in 10# (6%) wt loss exhibiting fat depletion with hollow orbital look and muscle loss with clavicle protruding  )    BMI Findings: Body mass index is 19 34 kg/m²  Diabetes mellitus type 2, diet-controlled ([facility] [address] )   Assessment & Plan    NSS        SDH (subdural hematoma) (HCC)   Assessment & Plan    Comfort care level, his hematoma is stable on CT scan of brain          * Severe sepsis ([facility] [address] )   Assessment & Plan    Probable sepsis POA evidencd by fever, lactic acidosis and tachypnea  Source of infection not entirely clear  Likely aspiration pneumonia given history  Current chest x-ray negative  Will repeat chest x-ray in a m  after hydration  Will get procalcitonin levels  Urinalysis at this point would not be a bad idea  Follow-up cultures  Leukocytosis not high  Continue broad-spectrum coverage/IV fluid  POA did not want to continue with pursuing, he is made comfort care                   Discharging Physician / Practitioner: Gurmeet Maradiaga MD  PCP: Titi Chan  Admission Date:   Admission Orders     Ordered        18 182  Inpatient Admission (expected length of stay for this patient is greater than two midnights)  Once             Discharge Date: 05/15/18    Resolved Problems  Date Reviewed: 2018          Resolved    Atrial fibrillation ([facility] [address] ) 2018     Resolved by  Gurmeet Maradiaga MD    LATOYA (acute kidney injury) ([facility] [address] ) 2018     Resolved by  Gurmeet Maradiaga MD    At high risk for aspiration 2018     Resolved by  Gurmeet Maradiaga MD          Consultations During Hospital Stay:  · Palliative care,     Procedures Performed:     ·  BMP, CBC with diff, fingerstick glucose  · Patient not able to take po intake, risk for aspiration  · He was considered septic at admission    Significant Findings / Test Results:     · WBC elevation, no pulmonary disease per chest xray, however cardiomegaly noted  Oxygen levels were normal he has tachycardia for infection vs inflammation process       Incidental Findings:   · none     Test Results Pending at Discharge (will require follow up):   · none     Outpatient Tests Requested:  · none    Complications:   overnight made comfort care     Reason for Admission:  Confusion and dyspnea    Hospital Course:     Martin Mccauley is a 68 y o  male with multiple comorbidies including parkinson disease, recent admission for SDH, he was at rehabilitation,  patient who originally presented to the hospital on 2018 due to encephalopathy and  Dyspnea  He has no hypoxia suggestive of PE, no EKG changes  He looks like he was septic with mild white count elevation and he was placed on antibiotics and cbc, bmp followed  Blood cultures after 5 days no growth  He was having trouble swallowing pills and not able to tolarate po medications  He was seen by palliative care specialists, they have discussed patient's wishes with Maribel Burger and decision was made to make him comfort measures  He was placed on pain medications and all other medications were adjusted to comfort care  HE was supposed to be picked up to go to hospice house  He was found unresponsive at night by the night call team, they have pronounced him dead  Please see above list of diagnoses and related plan for additional information  Condition at Discharge: death     Discharge Day Visit / Exam:     * Please refer to separate progress note for these details *    Discussion with Family: tereza and his wife are at bedside discussed regarding comfort care  Discharge instructions/Information to patient and family:   See after visit summary for information provided to patient and family  Provisions for Follow-Up Care:  See after visit summary for information related to follow-up care and any pertinent home health orders  Disposition:     Other:     For Discharges to [facility]:   · Not Applicable to this Patient - Not Applicable to this Patient    Planned Readmission: no     Discharge Statement:  I spent 45 minutes discharging the patient  This time was spent on the day of discharge  I had direct contact with the patient on the day of discharge  Greater than 50% of the total time was spent examining patient, answering all patient questions, arranging and discussing plan of care with patient as well as directly providing post-discharge instructions  Additional time then spent on discharge activities      Discharge Medications:  See after visit summary for reconciled discharge medications provided to patient and family        ** Please Note: This note has been constructed using a voice recognition system **

## 2018-05-15 NOTE — ASSESSMENT & PLAN NOTE
Low TSH with Low T4  Likely represent euthyroid-sick syndrome due to acute illness, will need repeat after 4-6 weeks